# Patient Record
Sex: FEMALE | Race: WHITE | Employment: OTHER
[De-identification: names, ages, dates, MRNs, and addresses within clinical notes are randomized per-mention and may not be internally consistent; named-entity substitution may affect disease eponyms.]

---

## 2017-01-04 ENCOUNTER — SURGERY (OUTPATIENT)
Age: 77
End: 2017-01-04

## 2017-01-04 ENCOUNTER — ANESTHESIA EVENT (OUTPATIENT)
Dept: ENDOSCOPY | Facility: HOSPITAL | Age: 77
End: 2017-01-04

## 2017-01-04 ENCOUNTER — ANESTHESIA (OUTPATIENT)
Dept: ENDOSCOPY | Facility: HOSPITAL | Age: 77
End: 2017-01-04

## 2017-01-04 NOTE — ANESTHESIA PREPROCEDURE EVALUATION
PRE-OP EVALUATION    Patient Name: Denice Davenport    Pre-op Diagnosis: NAUSEA    Procedure(s):  ESOPHAGOGASTRODUODENOSCOPY     Surgeon(s) and Role:     Lisa Crow MD - Primary    Pre-op vitals reviewed.   Temp: 97.7 °F (36.5 °C)  Pulse: 74  Re 2005, 10/2/08    Comment Mario Carrion MD    INJECTION, W/WO CONTRAST, DX/THERAPEUTIC SUBSTANCE, EPIDURAL/SUBARACHNOID; LUMBAR/SACRAL  7/17/2012    Comment Procedure: LUMBAR EPIDURAL;  Surgeon: Olivier Gallagher MD;  Location: [address] [place] [address] FACET INJECTION OR MEDIAL BRANCH NERVE BLOCK;  Surgeon: Cortez Rosa MD;  Location: [facility]    INJ PARAVERT F JNT L/S 1 LEV  3/5/2013    Comment Procedure: LUMBAR FACET INJECTION OR MEDIAL BRANCH NERVE BLOCK;  Surgeon: Dav Daily Karena Joya MD;  Location: [address] [place] [address]    PATIENT [address] [place] [address] FOR IV ANTIBIOTIC SURGICAL SITE INFECTION PROPHYLAXIS.  7/29/2013    Comment Procedure: LUMBAR RADIOFREQUENCY;  Surgeon: Karena Joya MD;  Location: [facility] Comment Procedure: CAUDAL EPIDURAL STEROID INJECTION;  Surgeon: Cori Guerra MD;  Location: [facility]    COLONOSCOPY  4/29/16    Comment [facility]- DR Maria Alejandra Garland- Repeat in 5 yrs    OOPHORECTOMY Bilateral 2004    Comment total hystero due to ovar

## 2017-01-04 NOTE — ANESTHESIA POSTPROCEDURE EVALUATION
[address] [place] [address] Patient Status:  Hospital Outpatient Surgery   Age/Gender 67year old female MRN [id number]   Location [facility]. Attending Ko Saini MD   Hosp Day # 0 PCP Vargas Gomez MD       Anesthesia Po

## 2017-01-23 ENCOUNTER — APPOINTMENT (OUTPATIENT)
Dept: LAB | Age: 77
End: 2017-01-23
Attending: STUDENT IN AN ORGANIZED HEALTH CARE EDUCATION/TRAINING PROGRAM
Payer: MEDICARE

## 2017-01-23 DIAGNOSIS — K30 FUNCTIONAL DYSPEPSIA: ICD-10-CM

## 2017-01-23 LAB
FOLATE (FOLIC ACID), SERUM: >100 NG/ML (ref 8.7–24)
HAV AB SERPL IA-ACNC: 406 PG/ML (ref 193–986)

## 2017-01-23 PROCEDURE — 83921 ORGANIC ACID SINGLE QUANT: CPT

## 2017-01-23 PROCEDURE — 82746 ASSAY OF FOLIC ACID SERUM: CPT

## 2017-01-23 PROCEDURE — 36415 COLL VENOUS BLD VENIPUNCTURE: CPT

## 2017-01-23 PROCEDURE — 82607 VITAMIN B-12: CPT

## 2017-01-25 LAB — MMA: 0.23 UMOL/L

## 2017-01-30 ENCOUNTER — HOSPITAL ENCOUNTER (OUTPATIENT)
Dept: NUCLEAR MEDICINE | Facility: HOSPITAL | Age: 77
Discharge: HOME | End: 2017-01-30
Attending: STUDENT IN AN ORGANIZED HEALTH CARE EDUCATION/TRAINING PROGRAM
Payer: MEDICARE

## 2017-01-30 DIAGNOSIS — K30 FUNCTIONAL DYSPEPSIA: ICD-10-CM

## 2017-01-30 PROCEDURE — 78264 GASTRIC EMPTYING IMG STUDY: CPT

## 2017-02-01 ENCOUNTER — MED REC SCAN ONLY (OUTPATIENT)
Dept: INTERNAL MEDICINE CLINIC | Facility: CLINIC | Age: 77
End: 2017-02-01

## 2017-02-07 PROBLEM — M54.50 CHRONIC LEFT-SIDED LOW BACK PAIN WITHOUT SCIATICA: Status: ACTIVE | Noted: 2017-02-07

## 2017-02-07 PROBLEM — G89.29 CHRONIC LEFT-SIDED LOW BACK PAIN WITHOUT SCIATICA: Status: ACTIVE | Noted: 2017-02-07

## 2017-04-20 ENCOUNTER — OFFICE VISIT (OUTPATIENT)
Dept: INTERNAL MEDICINE CLINIC | Facility: CLINIC | Age: 77
End: 2017-04-20

## 2017-04-20 ENCOUNTER — APPOINTMENT (OUTPATIENT)
Dept: LAB | Age: 77
End: 2017-04-20
Attending: PHYSICIAN ASSISTANT
Payer: MEDICARE

## 2017-04-20 VITALS
BODY MASS INDEX: 23.75 KG/M2 | SYSTOLIC BLOOD PRESSURE: 118 MMHG | DIASTOLIC BLOOD PRESSURE: 70 MMHG | WEIGHT: 121 LBS | HEART RATE: 72 BPM | HEIGHT: 60 IN | TEMPERATURE: 98 F

## 2017-04-20 DIAGNOSIS — K86.2 PANCREATIC CYST: Primary | ICD-10-CM

## 2017-04-20 DIAGNOSIS — K86.2 PANCREATIC CYST: ICD-10-CM

## 2017-04-20 DIAGNOSIS — R10.13 MIDEPIGASTRIC PAIN: ICD-10-CM

## 2017-04-20 DIAGNOSIS — R11.0 CHRONIC NAUSEA: ICD-10-CM

## 2017-04-20 PROCEDURE — 82150 ASSAY OF AMYLASE: CPT

## 2017-04-20 PROCEDURE — 99214 OFFICE O/P EST MOD 30 MIN: CPT | Performed by: PHYSICIAN ASSISTANT

## 2017-04-20 PROCEDURE — 83690 ASSAY OF LIPASE: CPT

## 2017-04-20 PROCEDURE — 80053 COMPREHEN METABOLIC PANEL: CPT

## 2017-04-20 NOTE — PROGRESS NOTES
Patient presents with:  Colitis: possible; dx'd by [facility] GI       HPI:  Pt presents for follow up of abd pain and nausea. .  Pt reports she has seen GI, no apparent cause found and she did not respond to PPI and anti-emetics.   GI note reviewed with pt an chemistry     Atherosclerosis of native arteries of the extremities, unspecified     Other screening mammogram     Disorder of bone and cartilage, unspecified     Elevated blood pressure reading without diagnosis of hypertension     Allergic rhinitis, caus mmHg  Pulse 72  Temp(Src) 97.9 °F (36.6 °C) (Oral)  Ht 60\"  Wt 121 lb  BMI 23.63 kg/m2  Constitutional:  No distress. HEENT:  Normocephalic and atraumatic. Cardiovascular: Normal rate, regular rhythm. No murmur, rubs or gallops.    Pulmonary/Chest: Ef

## 2017-04-21 DIAGNOSIS — R74.8 ELEVATED ALKALINE PHOSPHATASE LEVEL: Primary | ICD-10-CM

## 2017-04-22 ENCOUNTER — APPOINTMENT (OUTPATIENT)
Dept: LAB | Facility: HOSPITAL | Age: 77
End: 2017-04-22
Attending: PHYSICIAN ASSISTANT
Payer: MEDICARE

## 2017-04-22 DIAGNOSIS — R74.8 ELEVATED ALKALINE PHOSPHATASE LEVEL: ICD-10-CM

## 2017-04-22 PROCEDURE — 36415 COLL VENOUS BLD VENIPUNCTURE: CPT

## 2017-04-22 PROCEDURE — 80076 HEPATIC FUNCTION PANEL: CPT

## 2017-04-22 PROCEDURE — 84080 ASSAY ALKALINE PHOSPHATASES: CPT

## 2017-04-22 PROCEDURE — 84075 ASSAY ALKALINE PHOSPHATASE: CPT

## 2017-04-26 DIAGNOSIS — R74.8 ABNORMAL SERUM LEVEL OF ALKALINE PHOSPHATASE: Primary | ICD-10-CM

## 2017-04-26 NOTE — PROGRESS NOTES
Quick Note:    Increased alk phos bone fractionation. Discussed with Dr. Paige Darling. Need bone scan in work up. I ordered.  Please have pt complete and keep follow up with JL.  ______

## 2017-05-01 ENCOUNTER — TELEPHONE (OUTPATIENT)
Dept: INTERNAL MEDICINE CLINIC | Facility: CLINIC | Age: 77
End: 2017-05-01

## 2017-05-01 ENCOUNTER — HOSPITAL ENCOUNTER (OUTPATIENT)
Dept: NUCLEAR MEDICINE | Facility: HOSPITAL | Age: 77
Discharge: HOME | End: 2017-05-01
Attending: PHYSICIAN ASSISTANT
Payer: MEDICARE

## 2017-05-01 DIAGNOSIS — R74.8 ABNORMAL SERUM LEVEL OF ALKALINE PHOSPHATASE: ICD-10-CM

## 2017-05-01 PROCEDURE — 78399 UNLISTED MUSCSKEL PX DX NUC: CPT

## 2017-05-01 PROCEDURE — 78320 NM BONE SPECT WITH CT (CPT=78306/78320/78399): CPT

## 2017-05-01 PROCEDURE — 78306 BONE IMAGING WHOLE BODY: CPT

## 2017-05-01 NOTE — TELEPHONE ENCOUNTER
Received call from Krystina Tamez at [facility], pt currently completing NM study. Requesting CB change NM bone scan to NM bone with Spect. States that is how study was done in previous years and that's what they would like for comparison.  Advised CB is o

## 2017-05-01 NOTE — PROGRESS NOTES
Quick Note:    Bone scan is showing uptake in the sacrum that are consistent with stress fractures. I would have her f/u with Ortho regarding this (she has already been seeing) and also uptake related to arthritis in several locations. . Radiology suggested

## 2017-05-02 ENCOUNTER — HOSPITAL ENCOUNTER (OUTPATIENT)
Dept: MRI IMAGING | Facility: HOSPITAL | Age: 77
Discharge: HOME | End: 2017-05-02
Attending: PHYSICIAN ASSISTANT
Payer: MEDICARE

## 2017-05-02 DIAGNOSIS — K86.2 PANCREATIC CYST: ICD-10-CM

## 2017-05-02 DIAGNOSIS — R11.0 CHRONIC NAUSEA: ICD-10-CM

## 2017-05-02 DIAGNOSIS — R10.13 MIDEPIGASTRIC PAIN: ICD-10-CM

## 2017-05-02 DIAGNOSIS — R94.8 ABNORMAL BONE SCAN OF CERVICAL SPINE: Primary | ICD-10-CM

## 2017-05-02 PROCEDURE — A9575 INJ GADOTERATE MEGLUMI 0.1ML: HCPCS | Performed by: PHYSICIAN ASSISTANT

## 2017-05-02 PROCEDURE — 74183 MRI ABD W/O CNTR FLWD CNTR: CPT

## 2017-05-03 NOTE — PROGRESS NOTES
Quick Note:    New foci seen in the pancreas, the older foci appears stable. Pt needs to follow up with Dr. Krystyna Wolfe (GI) regarding these new changes. Unclear what these are at this time.   ______

## 2017-05-10 ENCOUNTER — APPOINTMENT (OUTPATIENT)
Dept: LAB | Age: 77
End: 2017-05-10
Attending: STUDENT IN AN ORGANIZED HEALTH CARE EDUCATION/TRAINING PROGRAM
Payer: MEDICARE

## 2017-05-10 DIAGNOSIS — K59.1 FUNCTIONAL DIARRHEA: ICD-10-CM

## 2017-05-10 DIAGNOSIS — R11.0 NAUSEA: ICD-10-CM

## 2017-05-10 DIAGNOSIS — R10.84 ABDOMINAL PAIN, GENERALIZED: ICD-10-CM

## 2017-05-10 DIAGNOSIS — K30 INDIGESTION: ICD-10-CM

## 2017-05-10 PROCEDURE — 83690 ASSAY OF LIPASE: CPT

## 2017-05-10 PROCEDURE — 36415 COLL VENOUS BLD VENIPUNCTURE: CPT

## 2017-05-10 PROCEDURE — 82150 ASSAY OF AMYLASE: CPT

## 2017-05-11 ENCOUNTER — APPOINTMENT (OUTPATIENT)
Dept: LAB | Age: 77
End: 2017-05-11
Attending: STUDENT IN AN ORGANIZED HEALTH CARE EDUCATION/TRAINING PROGRAM
Payer: MEDICARE

## 2017-05-11 DIAGNOSIS — R11.0 NAUSEA: ICD-10-CM

## 2017-05-11 DIAGNOSIS — K30 INDIGESTION: ICD-10-CM

## 2017-05-11 DIAGNOSIS — R10.84 ABDOMINAL PAIN, GENERALIZED: ICD-10-CM

## 2017-05-11 DIAGNOSIS — K59.1 FUNCTIONAL DIARRHEA: ICD-10-CM

## 2017-05-11 PROCEDURE — 82656 EL-1 FECAL QUAL/SEMIQ: CPT

## 2017-05-22 ENCOUNTER — MED REC SCAN ONLY (OUTPATIENT)
Dept: INTERNAL MEDICINE CLINIC | Facility: CLINIC | Age: 77
End: 2017-05-22

## 2017-07-03 ENCOUNTER — MED REC SCAN ONLY (OUTPATIENT)
Dept: OBGYN CLINIC | Facility: CLINIC | Age: 77
End: 2017-07-03

## 2017-07-03 NOTE — PROGRESS NOTES
HPI:    Patient ID: Olivia Schwartz is a 67year old female.     HPI  Pt here today to fu after visit with CB in April, was to fu in 3 weeks, several tests done, seeing psych for increased anxiety, having nausea, negative gi monson, titrating off cymbalta, s (three) times daily. Disp: 180 tablet Rfl: 0   Cholecalciferol (VITAMIN D) 1000 UNITS Oral Tab Take by mouth daily. Disp:  Rfl:    CALCIUM + D 600-200 MG-UNIT OR TABS 2 TABLET DAILY Disp:  Rfl:      Allergies:   Other                     Prochlorperazine

## 2017-07-11 ENCOUNTER — MED REC SCAN ONLY (OUTPATIENT)
Dept: INTERNAL MEDICINE CLINIC | Facility: CLINIC | Age: 77
End: 2017-07-11

## 2017-07-11 ENCOUNTER — TELEPHONE (OUTPATIENT)
Dept: INTERNAL MEDICINE CLINIC | Facility: CLINIC | Age: 77
End: 2017-07-11

## 2017-07-11 NOTE — PROGRESS NOTES
Received office visit from Dana Morin MD. [address] 7/7/2017   Placed on JL bin to review and sign

## 2017-07-18 PROBLEM — M54.50 CHRONIC LOW BACK PAIN: Status: ACTIVE | Noted: 2017-07-18

## 2017-07-18 PROBLEM — G89.29 CHRONIC LOW BACK PAIN: Status: ACTIVE | Noted: 2017-07-18

## 2017-07-25 PROBLEM — S22.058A: Status: ACTIVE | Noted: 2017-07-25

## 2017-07-25 PROBLEM — M80.00XA OSTEOPOROSIS WITH CURRENT PATHOLOGICAL FRACTURE, UNSPECIFIED OSTEOPOROSIS TYPE, INITIAL ENCOUNTER: Status: ACTIVE | Noted: 2017-07-25

## 2017-07-27 ENCOUNTER — LAB ENCOUNTER (OUTPATIENT)
Dept: LAB | Age: 77
End: 2017-07-27
Attending: PAIN MEDICINE
Payer: COMMERCIAL

## 2017-07-27 DIAGNOSIS — S22.058A: ICD-10-CM

## 2017-07-27 DIAGNOSIS — M80.08XG PATHOLOGICAL FRACTURE OF VERTEBRA DUE TO OSTEOPOROSIS WITH DELAYED HEALING, UNSPECIFIED OSTEOPOROSIS TYPE, SUBSEQUENT ENCOUNTER: ICD-10-CM

## 2017-07-27 DIAGNOSIS — M48.30 TRAUMATIC SPONDYLOPATHY: ICD-10-CM

## 2017-07-27 DIAGNOSIS — S22.000G CLOSED WEDGE FRACTURE OF THORACIC VERTEBRA WITH DELAYED HEALING, UNSPECIFIED THORACIC VERTEBRAL LEVEL, SUBSEQUENT ENCOUNTER: ICD-10-CM

## 2017-07-27 PROCEDURE — 88304 TISSUE EXAM BY PATHOLOGIST: CPT

## 2017-07-27 PROCEDURE — 88311 DECALCIFY TISSUE: CPT

## 2017-08-01 PROCEDURE — 83970 ASSAY OF PARATHORMONE: CPT | Performed by: PHYSICIAN ASSISTANT

## 2017-08-02 PROBLEM — Z98.890 S/P KYPHOPLASTY: Status: ACTIVE | Noted: 2017-08-02

## 2017-09-26 ENCOUNTER — TELEPHONE (OUTPATIENT)
Dept: INTERNAL MEDICINE CLINIC | Facility: CLINIC | Age: 77
End: 2017-09-26

## 2017-09-26 NOTE — TELEPHONE ENCOUNTER
Pt has had colitis for 2 years now and has seen [facility] and she is not getting any relief from either of them-she is asking who JL can refer her to now??

## 2017-09-28 NOTE — TELEPHONE ENCOUNTER
Allen Blackwood MD   You Yesterday (9:46 AM)      I would defer to [facility] on their recomendations (Routing comment)

## 2017-09-28 NOTE — TELEPHONE ENCOUNTER
Patient notified Carolin Jones would defer to [facility] for recommendations. Pt verbalizes understanding.

## 2017-10-25 ENCOUNTER — OFFICE VISIT (OUTPATIENT)
Dept: INTERNAL MEDICINE CLINIC | Facility: CLINIC | Age: 77
End: 2017-10-25

## 2017-10-25 VITALS
OXYGEN SATURATION: 98 % | RESPIRATION RATE: 16 BRPM | BODY MASS INDEX: 22.38 KG/M2 | SYSTOLIC BLOOD PRESSURE: 120 MMHG | DIASTOLIC BLOOD PRESSURE: 60 MMHG | HEART RATE: 83 BPM | WEIGHT: 114 LBS | HEIGHT: 60 IN

## 2017-10-25 DIAGNOSIS — E78.00 HYPERCHOLESTEROLEMIA: ICD-10-CM

## 2017-10-25 DIAGNOSIS — Z12.39 SCREENING FOR BREAST CANCER: ICD-10-CM

## 2017-10-25 DIAGNOSIS — Z00.00 ROUTINE GENERAL MEDICAL EXAMINATION AT A HEALTH CARE FACILITY: Primary | ICD-10-CM

## 2017-10-25 DIAGNOSIS — I10 ESSENTIAL HYPERTENSION: ICD-10-CM

## 2017-10-25 PROCEDURE — G0008 ADMIN INFLUENZA VIRUS VAC: HCPCS | Performed by: INTERNAL MEDICINE

## 2017-10-25 PROCEDURE — G0439 PPPS, SUBSEQ VISIT: HCPCS | Performed by: INTERNAL MEDICINE

## 2017-10-25 PROCEDURE — 90653 IIV ADJUVANT VACCINE IM: CPT | Performed by: INTERNAL MEDICINE

## 2017-10-25 PROCEDURE — 99212 OFFICE O/P EST SF 10 MIN: CPT | Performed by: INTERNAL MEDICINE

## 2017-10-25 NOTE — PROGRESS NOTES
HPI:    Patient ID: Bill Chin is a 65year old female.     HPI  Here for awv, currently being evaluated for small bowel bacterial overgrowth, htn, parkinsons dx, thinks had prevnar 13 at [facility], will check, last optho 2 years ago, reminded to go, has Tab Take 2,000 Units by mouth daily. Disp:  Rfl:    CALCIUM + D 600-200 MG-UNIT OR TABS 2 TABLET DAILY Disp:  Rfl:      Allergies: Other                     Prochlorperazine            Comment:Other reaction(s):  Other (See Comments)             may wors

## 2017-11-30 ENCOUNTER — LAB ENCOUNTER (OUTPATIENT)
Dept: LAB | Age: 77
End: 2017-11-30
Attending: INTERNAL MEDICINE
Payer: MEDICARE

## 2017-11-30 DIAGNOSIS — E78.00 HYPERCHOLESTEROLEMIA: ICD-10-CM

## 2017-11-30 DIAGNOSIS — I10 ESSENTIAL HYPERTENSION: ICD-10-CM

## 2017-11-30 PROCEDURE — 36415 COLL VENOUS BLD VENIPUNCTURE: CPT

## 2017-11-30 PROCEDURE — 85025 COMPLETE CBC W/AUTO DIFF WBC: CPT

## 2017-11-30 PROCEDURE — 80053 COMPREHEN METABOLIC PANEL: CPT

## 2017-11-30 PROCEDURE — 80061 LIPID PANEL: CPT

## 2017-12-13 ENCOUNTER — NURSE ONLY (OUTPATIENT)
Dept: HEMATOLOGY/ONCOLOGY | Facility: HOSPITAL | Age: 77
End: 2017-12-13
Attending: OBSTETRICS & GYNECOLOGY
Payer: MEDICARE

## 2017-12-13 DIAGNOSIS — C56.9 OVARIAN CANCER (HCC): ICD-10-CM

## 2017-12-13 DIAGNOSIS — C56.9 MALIGNANT NEOPLASM OF OVARY (HCC): Primary | ICD-10-CM

## 2017-12-13 PROCEDURE — 86304 IMMUNOASSAY TUMOR CA 125: CPT

## 2017-12-13 PROCEDURE — 36591 DRAW BLOOD OFF VENOUS DEVICE: CPT

## 2017-12-13 RX ORDER — SODIUM CHLORIDE 0.9 % (FLUSH) 0.9 %
10 SYRINGE (ML) INJECTION ONCE
Status: COMPLETED | OUTPATIENT
Start: 2017-12-13 | End: 2017-12-13

## 2017-12-13 RX ADMIN — SODIUM CHLORIDE 0.9 % (FLUSH) 10 ML: 0.9 % SYRINGE (ML) INJECTION at 10:50:00

## 2017-12-14 ENCOUNTER — HOSPITAL ENCOUNTER (OUTPATIENT)
Dept: MAMMOGRAPHY | Age: 77
Discharge: HOME | End: 2017-12-14
Attending: INTERNAL MEDICINE
Payer: MEDICARE

## 2017-12-14 DIAGNOSIS — Z12.39 SCREENING FOR BREAST CANCER: ICD-10-CM

## 2017-12-14 PROCEDURE — 77067 SCR MAMMO BI INCL CAD: CPT | Performed by: INTERNAL MEDICINE

## 2018-01-09 ENCOUNTER — TELEPHONE (OUTPATIENT)
Dept: OBGYN CLINIC | Facility: CLINIC | Age: 78
End: 2018-01-09

## 2018-01-30 PROBLEM — M80.00XD AGE-RELATED OSTEOPOROSIS WITH CURRENT PATHOLOGICAL FRACTURE WITH ROUTINE HEALING, SUBSEQUENT ENCOUNTER: Status: ACTIVE | Noted: 2018-01-30

## 2018-02-20 DIAGNOSIS — E78.00 PURE HYPERCHOLESTEROLEMIA: ICD-10-CM

## 2018-02-20 RX ORDER — SIMVASTATIN 20 MG
TABLET ORAL
Qty: 90 TABLET | Refills: 3 | Status: SHIPPED | OUTPATIENT
Start: 2018-02-20 | End: 2018-11-05

## 2018-02-28 ENCOUNTER — ANESTHESIA EVENT (OUTPATIENT)
Dept: ENDOSCOPY | Facility: HOSPITAL | Age: 78
End: 2018-02-28

## 2018-02-28 ENCOUNTER — SURGERY (OUTPATIENT)
Age: 78
End: 2018-02-28

## 2018-02-28 ENCOUNTER — ANESTHESIA (OUTPATIENT)
Dept: ENDOSCOPY | Facility: HOSPITAL | Age: 78
End: 2018-02-28

## 2018-02-28 ENCOUNTER — HOSPITAL ENCOUNTER (OUTPATIENT)
Facility: HOSPITAL | Age: 78
Setting detail: HOSPITAL OUTPATIENT SURGERY
Discharge: HOME | End: 2018-02-28
Attending: INTERNAL MEDICINE | Admitting: INTERNAL MEDICINE
Payer: MEDICARE

## 2018-02-28 VITALS
HEART RATE: 62 BPM | TEMPERATURE: 98 F | SYSTOLIC BLOOD PRESSURE: 142 MMHG | OXYGEN SATURATION: 99 % | RESPIRATION RATE: 18 BRPM | HEIGHT: 60 IN | BODY MASS INDEX: 22.58 KG/M2 | WEIGHT: 115 LBS | DIASTOLIC BLOOD PRESSURE: 55 MMHG

## 2018-02-28 DIAGNOSIS — R11.0 NAUSEA: ICD-10-CM

## 2018-02-28 PROCEDURE — 0DB68ZX EXCISION OF STOMACH, VIA NATURAL OR ARTIFICIAL OPENING ENDOSCOPIC, DIAGNOSTIC: ICD-10-PCS | Performed by: INTERNAL MEDICINE

## 2018-02-28 PROCEDURE — 88305 TISSUE EXAM BY PATHOLOGIST: CPT | Performed by: INTERNAL MEDICINE

## 2018-02-28 RX ORDER — SODIUM CHLORIDE, SODIUM LACTATE, POTASSIUM CHLORIDE, CALCIUM CHLORIDE 600; 310; 30; 20 MG/100ML; MG/100ML; MG/100ML; MG/100ML
INJECTION, SOLUTION INTRAVENOUS CONTINUOUS
Status: DISCONTINUED | OUTPATIENT
Start: 2018-02-28 | End: 2018-02-28

## 2018-02-28 NOTE — DISCHARGE SUMMARY
Outpatient Surgery Brief Discharge Summary         Patient ID:  Marie Sanders  [id number]  82 year old  9/28/1940    Discharge Diagnoses: Gastritis, Esophagitis, hiatal hernia    Procedures: EGD    Discharged Condition: stable    Disposition: home    P

## 2018-02-28 NOTE — OPERATIVE REPORT
[facility] OPERATIVE REPORT   PATIENT NAME: Jahaira Ford  MRN: [id number]  DATE OF OPERATION: 2/28/2018  PREOPERATIVE DIAGNOSIS:   1. Nausea   POSTOPERATIVE DIAGNOSES:   Erosive Gastritis s/p biopsy  5 cm hiatal hernia  1 d

## 2018-02-28 NOTE — H&P
HPI:  Melissa Hernandez is a 65year old female. 9/28/1940.   Patient presents with:  Nausea  Gas  Bloating  Abdominal Pain: occasional. lower pain.        Phu Higuera is here today for a follow up visit to discuss nausea.      pushes to eat during the [facility]  7/29/2013: DESTROY LUMB/SAC FACET JNT      Comment: Procedure: Francisco Deng;  Surgeon:                Joel Hu MD;  Location:  [address] MANAGEMENT  1/4/2017: EGD N/A      C Location: [address] [place] [address] [place] MANAGEMENT  7/17/2012: INJECTION, W/WO CONTRAST, DX/THERAPEUTIC SUBST*      Comment: Procedure: LUMBAR EPIDURAL;  Surgeon:                Tabatha Marsh MD;  Location: [place] [address] MANAGEMENT  12/23/201 Location: [address] [place] [address] [facility] MANAGEMENT  3/5/2013: PATIENT DOCUMENTED NOT TO HAVE EXPERIENCED ANY*      Comment: Procedure: LUMBAR FACET INJECTION OR MEDIAL                BRANCH NERVE BLOCK;  Surgeon: Joel Hu MD;  Location: [facility] Surgeon: Ladonna Linod MD;  Location: [facility]  1999: SHOULDER SURG PROC UNLISTED      Comment: Bone spurs removed (Rt shoulder)  9/2/10= HH: UPPER GI ENDOSCOPY PERFORMED   Social History  Smoking status: Former Smoker Disp:  Rfl:    CALCIUM + D 600-200 MG-UNIT OR TABS 2 TABLET DAILY Disp:  Rfl:             REVIEW OF SYSTEMS:   GENERAL: feels well otherwise  SKIN: denies any unusual skin lesions  EYES: denies any new visual changes or double vision  HEENT: denies any new Decay-corrected gastric emptying values were derived utilizing geometric mean values from the anterior and posterior projections.     PHARMACEUTICAL:  Technetium 99m, 1.0 uCi     FINDINGS:       1 Hour:   10 %   (Normal = 10% - 70%)  2 Hour:   47 %   (Norm extrarenal pelvis. BONES:  Scoliotic curvature to the spine. Degenerative change to the spine.     =====  CONCLUSION:    1. There are multiple T2 hyperintense foci within the pancreas.  For example, there is an 8 mm T2 hyperintense nonenhancing focus withi

## 2018-02-28 NOTE — ANESTHESIA PREPROCEDURE EVALUATION
PRE-OP EVALUATION    Patient Name: Bard Cogan    Pre-op Diagnosis: Nausea [R11.0]    Procedure(s):  ESOPHAGOGASTRODUODENOSCOPY     Surgeon(s) and Role:     * Lilo Panchal MD - Primary    Pre-op vitals reviewed.   Temp: 97.7 °F (36.5 °C)  Pulse: Endo/Other                                  Pulmonary                           Neuro/Psych  Comment: parkinsons dx                                  Past Surgical History:  1994: CHOLECYSTECTOMY  2005, 10/2/08: COLONOSCOPY      Comment: Soraida Lucero MD INJ PARAVERT F JNT L/S 2 LEV      Comment: Procedure: LUMBAR FACET INJECTION OR MEDIAL                BRANCH NERVE BLOCK;  Surgeon: Edith Mcconnell MD;  Location: [address] [place] [address] MANAGEMENT  2/14/2013: INJ PARAVERT F JNT L/S 2 LEV      C [address] [facility] 59  N SVC 5+ YR      Comment: Procedure: LUMBAR FACET INJECTION OR MEDIAL                BRANCH NERVE BLOCK;  Surgeon: Racheal Rosado MD;  Location: [address] [place] [address] MANAGEMENT  7/29/2013: PAPA BY [facility] [address] [facility] 59  N SVC 5+ YR      Comm [facility]  3/5/2013: PATIENT Omar Velarde PREOPERATIVE ORDER FOR IV ANT*      Comment: Procedure: LUMBAR FACET INJECTION OR MEDIAL                BRANCH NERVE BLOCK;  Surgeon: Silvina Hudson MD;  Location: [address] [place] [address]

## 2018-02-28 NOTE — ANESTHESIA POSTPROCEDURE EVALUATION
[address] [place] [address] Patient Status:  Hospital Outpatient Surgery   Age/Gender 65year old female MRN [id number]   [facility] ENDOSCOPY Attending Yazan Ng MD   Hosp Day # 0 PCP Juan Carlos Musa MD       Anesthesia Post

## 2018-03-05 NOTE — PROGRESS NOTES
3/5/2018  [address]    Dear Jonatan Reis,       Here are the biopsy/pathology findings from your recent EGD (Upper  Endoscopy) :     gastritis - an inflammation of the lining of the stomach.       Follow Up:  Continu

## 2018-03-26 ENCOUNTER — OFFICE VISIT (OUTPATIENT)
Dept: INTERNAL MEDICINE CLINIC | Facility: CLINIC | Age: 78
End: 2018-03-26

## 2018-03-26 VITALS
HEART RATE: 72 BPM | WEIGHT: 113 LBS | TEMPERATURE: 98 F | SYSTOLIC BLOOD PRESSURE: 142 MMHG | BODY MASS INDEX: 22.19 KG/M2 | DIASTOLIC BLOOD PRESSURE: 80 MMHG | HEIGHT: 60 IN

## 2018-03-26 DIAGNOSIS — R35.0 URINARY FREQUENCY: Primary | ICD-10-CM

## 2018-03-26 DIAGNOSIS — G20 PARKINSON DISEASE (HCC): ICD-10-CM

## 2018-03-26 LAB
APPEARANCE: CLEAR
MULTISTIX LOT#: ABNORMAL NUMERIC
PH, URINE: 7.5 (ref 4.5–8)
SPECIFIC GRAVITY: 1.01 (ref 1–1.03)
URINE-COLOR: YELLOW
UROBILINOGEN,SEMI-QN: 0.2 MG/DL (ref 0–1.9)

## 2018-03-26 PROCEDURE — 81003 URINALYSIS AUTO W/O SCOPE: CPT | Performed by: INTERNAL MEDICINE

## 2018-03-26 PROCEDURE — 99213 OFFICE O/P EST LOW 20 MIN: CPT | Performed by: INTERNAL MEDICINE

## 2018-03-26 NOTE — PROGRESS NOTES
Loco Clifford  9/28/1940    Patient presents with:  Urinary Frequency: LG. Room 12.  And bladder pressrue for a couple of months on and off      Kyara Mayo is a 65year old female with history of parkinson disease who presents with mg by mouth daily. Disp:  Rfl:    carbidopa-levodopa (SINEMET)  MG Oral Tab Take 2 tablets by mouth 3 (three) times daily. Disp: 180 tablet Rfl: 0   Cholecalciferol (VITAMIN D) 1000 UNITS Oral Tab Take 2,000 Units by mouth daily.    Disp:  Rfl:    STARLA degenerative disc disease     Bulge of lumbar disc without myelopathy     Myofascial pain     Lumbar spondylosis     Malignant neoplasm of ovary (HCC)     Left hip pain     Pathologic fracture of vertebrae     Ovarian cancer (HCC)     Pancreas cyst     Col PAIN MANAGEMENT  8/2004: HYSTERECTOMY      Comment: Due to ovarian ca (chemo treatments)  2/14/2013: INJ PARAVERT F JNT L/S 1 LEV      Comment: Procedure: LUMBAR FACET INJECTION OR MEDIAL                BRANCH NERVE BLOCK;  Surgeon: Yobani Smith, INJECTION;                Surgeon: Raina Bauer MD;  Location: [place] [address]  7/17/2012: [facility]ALL[facility] BY [facility] 5+ YR      Comment: Procedure: LUMBAR EPIDURAL;  Surgeon:                Olivier Gallagher MD;  Location: [address] [place] [address] Procedure: CAUDAL EPIDURAL STEROID INJECTION;                Surgeon: Ria Bauer MD;  Location: [facility]  8/25/2015: PATIENT DOCUMENTED NOT TO HAVE EXPERIENCED ANY* N/A      Comment: Procedure: CAUDAL EPIDURAL STEROID INJECTION; Left arm, Patient Position: Sitting, Cuff Size: adult)   Pulse 72   Temp 98.1 °F (36.7 °C) (Oral)   Ht 60\"   Wt 113 lb   BMI 22.07 kg/m²   Constitutional: Oriented to person, place, and time. No distress.    Cardiovascular: Normal rate, regular rhythm and

## 2018-04-19 PROBLEM — M47.816 LUMBAR FACET ARTHROPATHY: Status: ACTIVE | Noted: 2018-04-19

## 2018-04-19 PROBLEM — M41.9 SCOLIOSIS OF THORACOLUMBAR SPINE, UNSPECIFIED SCOLIOSIS TYPE: Status: ACTIVE | Noted: 2018-04-19

## 2018-06-21 PROBLEM — R19.7 DIARRHEA: Status: ACTIVE | Noted: 2018-06-21

## 2018-06-23 PROCEDURE — 87427 SHIGA-LIKE TOXIN AG IA: CPT | Performed by: INTERNAL MEDICINE

## 2018-06-23 PROCEDURE — 87046 STOOL CULTR AEROBIC BACT EA: CPT | Performed by: INTERNAL MEDICINE

## 2018-06-23 PROCEDURE — 87045 FECES CULTURE AEROBIC BACT: CPT | Performed by: INTERNAL MEDICINE

## 2018-07-21 ENCOUNTER — HOSPITAL ENCOUNTER (OUTPATIENT)
Dept: MRI IMAGING | Facility: HOSPITAL | Age: 78
Discharge: HOME | End: 2018-07-21
Attending: PHYSICIAN ASSISTANT
Payer: MEDICARE

## 2018-07-21 DIAGNOSIS — Z85.43 H/O OVARIAN CANCER: ICD-10-CM

## 2018-07-21 DIAGNOSIS — M54.16 LUMBAR RADICULOPATHY: ICD-10-CM

## 2018-07-21 LAB — CREAT SERPL-MCNC: 0.4 MG/DL (ref 0.55–1.02)

## 2018-07-21 PROCEDURE — 82565 ASSAY OF CREATININE: CPT

## 2018-07-21 PROCEDURE — A9576 INJ PROHANCE MULTIPACK: HCPCS

## 2018-07-21 PROCEDURE — 72158 MRI LUMBAR SPINE W/O & W/DYE: CPT | Performed by: PHYSICIAN ASSISTANT

## 2018-07-23 NOTE — PROGRESS NOTES
Findings essentially stable.  Please let pt know and she may schedule f/u apt to further review at a time of her convenience

## 2018-07-26 PROBLEM — M48.061 SPINAL STENOSIS OF LUMBAR REGION WITHOUT NEUROGENIC CLAUDICATION: Status: ACTIVE | Noted: 2018-07-26

## 2018-07-26 PROBLEM — M54.16 LUMBAR RADICULITIS: Status: ACTIVE | Noted: 2018-07-26

## 2018-07-26 PROBLEM — M41.9 SCOLIOSIS OF LUMBAR SPINE, UNSPECIFIED SCOLIOSIS TYPE: Status: ACTIVE | Noted: 2018-07-26

## 2018-08-16 ENCOUNTER — TELEPHONE (OUTPATIENT)
Dept: INTERNAL MEDICINE CLINIC | Facility: CLINIC | Age: 78
End: 2018-08-16

## 2018-08-16 DIAGNOSIS — R03.0 ELEVATED BLOOD PRESSURE READING WITHOUT DIAGNOSIS OF HYPERTENSION: ICD-10-CM

## 2018-08-16 DIAGNOSIS — E78.00 PURE HYPERCHOLESTEROLEMIA: Primary | ICD-10-CM

## 2018-08-16 NOTE — TELEPHONE ENCOUNTER
Future Appointments  Date Time Provider Leon Maria De Jesus   9/11/2018 10:45 AM Nurse, Sp Ortho [facility] [facility]   9/11/2018 1:00 PM Arleen Restrepo MD [facility]PAIN [facility] AT    11/5/2018 8:00 AM Carmen Ayon MD [facility] [facility]   6/25/2019 4:00 PM

## 2018-10-30 ENCOUNTER — LAB ENCOUNTER (OUTPATIENT)
Dept: LAB | Age: 78
End: 2018-10-30
Attending: INTERNAL MEDICINE
Payer: MEDICARE

## 2018-10-30 DIAGNOSIS — E78.00 PURE HYPERCHOLESTEROLEMIA: ICD-10-CM

## 2018-10-30 DIAGNOSIS — R03.0 ELEVATED BLOOD PRESSURE READING WITHOUT DIAGNOSIS OF HYPERTENSION: ICD-10-CM

## 2018-10-30 PROCEDURE — 85025 COMPLETE CBC W/AUTO DIFF WBC: CPT

## 2018-10-30 PROCEDURE — 36415 COLL VENOUS BLD VENIPUNCTURE: CPT

## 2018-10-30 PROCEDURE — 80061 LIPID PANEL: CPT

## 2018-10-30 PROCEDURE — 80053 COMPREHEN METABOLIC PANEL: CPT

## 2018-11-05 ENCOUNTER — OFFICE VISIT (OUTPATIENT)
Dept: INTERNAL MEDICINE CLINIC | Facility: CLINIC | Age: 78
End: 2018-11-05
Payer: MEDICARE

## 2018-11-05 VITALS
BODY MASS INDEX: 23.16 KG/M2 | RESPIRATION RATE: 18 BRPM | WEIGHT: 118 LBS | HEART RATE: 84 BPM | TEMPERATURE: 98 F | SYSTOLIC BLOOD PRESSURE: 100 MMHG | HEIGHT: 60 IN | DIASTOLIC BLOOD PRESSURE: 70 MMHG

## 2018-11-05 DIAGNOSIS — Z98.890 S/P KYPHOPLASTY: ICD-10-CM

## 2018-11-05 DIAGNOSIS — M54.50 CHRONIC BILATERAL LOW BACK PAIN WITHOUT SCIATICA: ICD-10-CM

## 2018-11-05 DIAGNOSIS — Z12.39 SCREENING FOR BREAST CANCER: ICD-10-CM

## 2018-11-05 DIAGNOSIS — G89.29 CHRONIC BILATERAL LOW BACK PAIN WITHOUT SCIATICA: ICD-10-CM

## 2018-11-05 DIAGNOSIS — G20 PARKINSON'S DISEASE (HCC): ICD-10-CM

## 2018-11-05 DIAGNOSIS — E78.00 PURE HYPERCHOLESTEROLEMIA: ICD-10-CM

## 2018-11-05 DIAGNOSIS — Z00.00 ROUTINE GENERAL MEDICAL EXAMINATION AT A HEALTH CARE FACILITY: ICD-10-CM

## 2018-11-05 DIAGNOSIS — G25.81 RESTLESS LEGS SYNDROME (RLS): Primary | ICD-10-CM

## 2018-11-05 PROCEDURE — G0439 PPPS, SUBSEQ VISIT: HCPCS | Performed by: INTERNAL MEDICINE

## 2018-11-05 PROCEDURE — G0008 ADMIN INFLUENZA VIRUS VAC: HCPCS | Performed by: INTERNAL MEDICINE

## 2018-11-05 PROCEDURE — 90670 PCV13 VACCINE IM: CPT | Performed by: INTERNAL MEDICINE

## 2018-11-05 PROCEDURE — 99212 OFFICE O/P EST SF 10 MIN: CPT | Performed by: INTERNAL MEDICINE

## 2018-11-05 PROCEDURE — G0009 ADMIN PNEUMOCOCCAL VACCINE: HCPCS | Performed by: INTERNAL MEDICINE

## 2018-11-05 PROCEDURE — 90653 IIV ADJUVANT VACCINE IM: CPT | Performed by: INTERNAL MEDICINE

## 2018-11-05 RX ORDER — SIMVASTATIN 20 MG
TABLET ORAL
Qty: 90 TABLET | Refills: 3 | Status: SHIPPED | OUTPATIENT
Start: 2018-11-05 | End: 2019-12-11

## 2018-11-05 NOTE — PROGRESS NOTES
HPI:    Patient ID: Marie Sanders is a 77year old female.     HPI  Here for awv, following at [facility] for parkinsons, colitis and with [facility] pain for chronic back pain  /70   Pulse 84   Temp 98 °F (36.7 °C) (Oral)   Resp 18   Ht 60\"   Wt 118 lb   [facility] Disp:  Rfl:    aspirin 81 MG Oral Tab Take 81 mg by mouth daily. Disp:  Rfl:    carbidopa-levodopa (SINEMET)  MG Oral Tab Take 2 tablets by mouth 3 (three) times daily.  Disp: 180 tablet Rfl: 0   Cholecalciferol (VITAMIN D) 1000 UNITS Oral Tab Take 2 Pneumococcal (Prevnar 13) (12013) (DX V03.82/Z23)      Meds This Visit:  Requested Prescriptions     Signed Prescriptions Disp Refills   • simvastatin 20 MG Oral Tab 90 tablet 3     Sig: TAKE 1 TABLET EVERY NIGHT       Imaging & Referrals:  FLU VACCINE ADJ

## 2018-12-06 ENCOUNTER — OFFICE VISIT (OUTPATIENT)
Dept: INTERNAL MEDICINE CLINIC | Facility: CLINIC | Age: 78
End: 2018-12-06
Payer: MEDICARE

## 2018-12-06 VITALS
HEART RATE: 84 BPM | WEIGHT: 117 LBS | SYSTOLIC BLOOD PRESSURE: 120 MMHG | BODY MASS INDEX: 22.97 KG/M2 | DIASTOLIC BLOOD PRESSURE: 60 MMHG | HEIGHT: 60 IN | TEMPERATURE: 98 F

## 2018-12-06 DIAGNOSIS — M51.36 LUMBAR DEGENERATIVE DISC DISEASE: ICD-10-CM

## 2018-12-06 DIAGNOSIS — G20 PARKINSON DISEASE (HCC): ICD-10-CM

## 2018-12-06 DIAGNOSIS — M54.16 LUMBAR RADICULITIS: ICD-10-CM

## 2018-12-06 DIAGNOSIS — Z01.818 PREOP EXAMINATION: Primary | ICD-10-CM

## 2018-12-06 DIAGNOSIS — M47.26 OSTEOARTHRITIS OF SPINE WITH RADICULOPATHY, LUMBAR REGION: ICD-10-CM

## 2018-12-06 DIAGNOSIS — M41.9 SCOLIOSIS OF LUMBAR SPINE, UNSPECIFIED SCOLIOSIS TYPE: ICD-10-CM

## 2018-12-06 DIAGNOSIS — E78.5 DYSLIPIDEMIA: ICD-10-CM

## 2018-12-06 PROCEDURE — 99214 OFFICE O/P EST MOD 30 MIN: CPT | Performed by: INTERNAL MEDICINE

## 2018-12-06 RX ORDER — MULTIVIT-MIN/IRON/FOLIC ACID/K 18-600-40
1 CAPSULE ORAL DAILY
COMMUNITY
End: 2021-11-05 | Stop reason: DRUGHIGH

## 2018-12-06 NOTE — PROGRESS NOTES
[facility]  PRE OP RISK ASSESSMENT    REASON FOR CONSULT: Pre-op risk assessment for surgical procedure: Vertiflex planned for: Lumbar radiculitis and lumbar spondylosis.     REQUESTING PHYSICIAN: Dr. Jocelin Kwan: Patient pres by Helen Mejía MD at [facility] 2250 N/A 12/23/2014    Performed by Helen Mejía MD at [address] [place] [address] Rd  2005, 10/2/08    Soraida Lucero MD   • COLONOSCOPY  4/8/13   • [facility]   • LUMBAR RADIOFREQUENCY N/A 7/29/2013    Performed by Bill Swenson MD at [address]   • OOPHORECTOMY Bilateral 2004    total hystero due to ovarian cancer.    • OTHER SURGICAL HISTORY  2004    surgery for RASH    Review Complete  12/06/2018      SOCIAL HISTORY: Social History    Socioeconomic History      Marital status:       Spouse name: Not on file      Number of children: Not on file      Years of education: Not on file      Highest education lev exertion  GI: denies abdominal pain, denies heartburn  : denies dysuria, urgency, frequency, hematuria. Urinary incontinence. MUSCULOSKELETAL: Lower back pain. Lower extremity weakness. NEURO: denies headaches. Ataxia. Upper extremity tremor.     [place]

## 2018-12-13 PROBLEM — Z96.89 S/P INSERTION OF SPINAL CORD STIMULATOR: Status: ACTIVE | Noted: 2018-12-13

## 2018-12-30 NOTE — PROGRESS NOTES
Quick Note:    Labs are normal other than elevated alk phos. Would like to check alk phos isoenzymes (I ordered). Please also let pt know that I spoke with Dr. Beth Lange about her appt yesterday.  He is in agreement with first steps and has next step in min No

## 2019-01-28 ENCOUNTER — ORDER TRANSCRIPTION (OUTPATIENT)
Dept: PHYSICAL THERAPY | Age: 79
End: 2019-01-28

## 2019-01-28 DIAGNOSIS — M62.89 PELVIC FLOOR DYSFUNCTION: ICD-10-CM

## 2019-01-28 DIAGNOSIS — K59.02 CONSTIPATION BY OUTLET DYSFUNCTION: Primary | ICD-10-CM

## 2019-02-13 ENCOUNTER — OFFICE VISIT (OUTPATIENT)
Dept: PHYSICAL THERAPY | Age: 79
End: 2019-02-13
Attending: INTERNAL MEDICINE
Payer: MEDICARE

## 2019-02-13 PROCEDURE — 97112 NEUROMUSCULAR REEDUCATION: CPT

## 2019-02-13 PROCEDURE — 97530 THERAPEUTIC ACTIVITIES: CPT

## 2019-02-13 PROCEDURE — 97163 PT EVAL HIGH COMPLEX 45 MIN: CPT

## 2019-02-13 NOTE — PROGRESS NOTES
MUSCULOSKELETAL AND PELVIC FLOOR EVALUATION:     Referring Physician: Dr. Anitha Adamson  Diagnosis: Constipation by outlet dysfunction (K59.02)  Pelvic floor dysfunction (M62.89)     Date of Service: 2/13/2019     PATIENT Kenroy Jones is a 77 ye Lumbar spondylosis     Malignant neoplasm of ovary (HCC)     Left hip pain     Pathologic fracture of vertebrae     Ovarian cancer ([facility] [address]     Pancreas cyst     Colitis, collagenous     Adenomatous colon polyp     Chronic left-sided low back pain without [facility] evaluation,  patient exhibits kyphosis; palpable Sacral stimulation unit; and vertical scar tissue adhesions and LE weakness resulting in wheeled walker ambulation.   Patient also needs assistance with don and donning clothing for internal a Anal Craig: hypo bilateral    Tissue Laxity Test:  Anterior Wall: Mod  Posterior Wall:  Mod  Apical: Min    Internal Evaluation:   Scar: Perineum  Pain: Negative  Spasm: Negative     Voluntary contraction: weak  Voluntary relaxation: weak    Pelvic Floor Mus 2) Patient exhibits an increase in Levator Ani/ External Anal Sphincter strength from 1-2/5 with 3 count hold to 2-3/5  with 10 count hold to allow for pelvic brace with ADLs.   3) Patient exhibits an increased in  hip strength from 3/5 to 4/5 to allow for Patient/Family/Caregiver was advised of these findings, precautions, and treatment options and has agreed to actively participate in planning and for this course of care.     Thank you for your referral. Please co-sign or sign and return this letter via fax Focus on the relationship between your breathing diaphragm and the pelvic floor muscles. As you breathe in, let the pelvic floor relax. As you exhale, tighten and contract the pelvic floor as if the muscles are helping the air leave your lungs.

## 2019-02-18 ENCOUNTER — APPOINTMENT (OUTPATIENT)
Dept: PHYSICAL THERAPY | Age: 79
End: 2019-02-18
Attending: INTERNAL MEDICINE
Payer: MEDICARE

## 2019-02-20 ENCOUNTER — OFFICE VISIT (OUTPATIENT)
Dept: PHYSICAL THERAPY | Age: 79
End: 2019-02-20
Attending: INTERNAL MEDICINE
Payer: MEDICARE

## 2019-02-20 PROCEDURE — 97530 THERAPEUTIC ACTIVITIES: CPT

## 2019-02-20 PROCEDURE — 97112 NEUROMUSCULAR REEDUCATION: CPT

## 2019-02-20 NOTE — PROGRESS NOTES
Dx:  Constipation by outlet dysfunction (K59.02)  Pelvic floor dysfunction (M62.89)         Authorized # of Visits:  2/12         Next MD visit: none scheduled  Fall Risk: standard         Precautions: n/a             Subjective: Drinking 5 oz.  Of water, 5 Santino would benefit from skilled Pelvic Physical Therapy for Diet modification to prevent Constipation;  Internal Rectal Biofeedback for EAS neuromuscular reeducation;  Training of defecation postures/ techniques to evacuate bowel completely; and progres 7) Patient understands importance of performing HEP to prevent reoccurrence of symptoms.       Plan: Patient will be seen for 1-2 x/week or a total of 12 visits over a 90 day period.  Treatment will include: Treatment will include: Neuromuscular re-educati ? The normal range of voiding urine is 6 to 8 times during a 24 hour period. As we get older, our bladder capacity can get smaller and we may need to pass urine more frequently but usually not more than every 2 hours. ?  Urine should flow easily without di ? Limit the amount of alcohol you drink. Alcohol increases urine production and also makes it difficult for the brain to coordinate bladder control. ? Avoid constipation by maintaining a balanced diet of dietary fiber.     Cigarette smoking is also irritat Prunes   FOOD PREPARATION  Dietary fiber can be reduced in foods during preparation and cooking. To retain fiber, serve fresh fruits and vegetables. When preparing food leave edible skins and seeds, and use whole-grain flours.       Grams of Fiber in Food P Total grams of fiber    Compare your total grams of fiber to the daily recommendation of   25-35 grams. For additional information on fiber content in foods go to www.caloriecounts. com

## 2019-02-25 ENCOUNTER — OFFICE VISIT (OUTPATIENT)
Dept: PHYSICAL THERAPY | Age: 79
End: 2019-02-25
Attending: INTERNAL MEDICINE
Payer: MEDICARE

## 2019-02-25 PROCEDURE — 97110 THERAPEUTIC EXERCISES: CPT

## 2019-02-25 PROCEDURE — 97112 NEUROMUSCULAR REEDUCATION: CPT

## 2019-02-25 PROCEDURE — 97530 THERAPEUTIC ACTIVITIES: CPT

## 2019-02-25 NOTE — PROGRESS NOTES
Dx:  Constipation by outlet dysfunction (K59.02)  Pelvic floor dysfunction (M62.89)         Authorized # of Visits:  3/12         Next MD visit: none scheduled  Fall Risk: standard         Precautions: n/a             Subjective:  Incomplete BM and nausea i Kary Staples would benefit from skilled Pelvic Physical Therapy for Diet modification to prevent Constipation;  Internal Rectal Biofeedback for EAS neuromuscular reeducation;  Training of defecation postures/ techniques to evacuate bowel completely; and weses 7) Patient understands importance of performing HEP to prevent reoccurrence of symptoms.       Plan: Patient will be seen for 1-2 x/week or a total of 12 visits over a 90 day period.  Treatment will include: Treatment will include: Neuromuscular re-educati Neuromuscular reeducation:  Instruction on DB and 5 audible count PFM contraction with IVB, Pelvic brace with hip accessory strengthening, Pelvic brace with sit to stand,  Therapeutic exercise:  Nu Step Pelvic brace for cardiovascular exercise    Charges: ? Maintain a good fluid intake. Depending on your body size and environment, drink 5-6 cups (8 oz each) of fluid per day unless otherwise advised by your doctor. Not enough fluid creates a foul odor and dark color of the urine. ?  Limit the amount of caffe A good source of dietary fiber contains at least 2 grams per serving. Be sure to eat foods that contain both soluble and insoluble fiber. Insoluble fiber includes foods that are not easily mashable   ? Fruits  ? Vegetables  ? Dried beans  ? Wheat bran  ? Pear (with peel) 1 medium 4.32   Prunes 3 3.5   Raspberries 1 cup 7.50   Strawberries 1 cup 3.87   Watermelon 1 slice 5.78   *For additional information on fiber content in foods go to www.MemberPasss. Zoyi   *Read Nutritional Facts labels on the foods you

## 2019-03-04 ENCOUNTER — OFFICE VISIT (OUTPATIENT)
Dept: PHYSICAL THERAPY | Age: 79
End: 2019-03-04
Attending: INTERNAL MEDICINE
Payer: MEDICARE

## 2019-03-04 PROCEDURE — 97110 THERAPEUTIC EXERCISES: CPT

## 2019-03-04 PROCEDURE — 97112 NEUROMUSCULAR REEDUCATION: CPT

## 2019-03-04 PROCEDURE — 97530 THERAPEUTIC ACTIVITIES: CPT

## 2019-03-04 NOTE — PROGRESS NOTES
Dx:  Constipation by outlet dysfunction (K59.02)  Pelvic floor dysfunction (M62.89)         Authorized # of Visits:  4/12         Next MD visit: none scheduled  Fall Risk: standard         Precautions: Parkinson's         Subjective:Able to have BM daily w Internal vaginal assessment revealed Postmenopausal Changes; Hemorrhoids;   Fecal matter on perineum/ pad; 2/5 Levator Ani weakness; 1/5 EAS weakness; and poor awareness of Coordination of Pelvic floor musculature resulting in FI.)     Alyse Matos would mialdyi 5) Patient reports change in [place] stool #3-4 to #4 with daily bowel moment without straining and prevention of further pelvic organ prolapse.    6)  Patient reports a change in Marinoff scale from 3 to 1-0 to allow for intercourse and pelvic exam with m IVB for awareness of DB then 5 count PFM contraction    5W, 10R x20 Constipation education  Belly Hard Belly Big with Squatty Potty Constipation education  Belly Hard Belly Big with Squatty Potty        Pelvic brace with sit to stand Pelvic brace with sit ? An increase in fiber should be accompanied with an increase in water. ? Eat less processed foods and more fresh foods. A good source of dietary fiber contains at least 2 grams per serving.  Be sure to eat foods that contain both soluble and insoluble Boysenberries 1 cup 7.20   Grapefruit 1 medium 3.61   Grapes 1 cup 1.12   Nectarine 1 medium 2.2   Orange 1 medium 3.14   Pear (with peel) 1 medium 4.32   Prunes 3 3.5   Raspberries 1 cup 7.50   Strawberries 1 cup 3.87   Watermelon 1 slice 3.41   *For nahun ? Imbalances in the diet (too much sugar and animal fat)  ? Sedentary lifestyle  ? Repeatedly ignoring the urge to have a BM  ? Slow movement of the stool – too much water absorption in the colon  ? Lifestyle changes, such as pregnancy and travel  ?  Kavin Gonzales Your body has a natural emptying reflex. Approximately ½ hour after eating a meal or drinking a hot beverage, a reflex occurs to increase motility or movement of the stool down to the rectum.  This reflex usually occurs in the mornings when trying to get yo ? The average bladder can hold about 2 cups of urine before it needs to be emptied. ? The normal range of voiding urine is 6 to 8 times during a 24 hour period.  As we get older, our bladder capacity can get smaller and we may need to pass urine more frequ ? Limit the amount of caffeine (coffee, cola, chocolate or tea) and citrus foods that you consume as these foods can be associated with increased sensation of urinary urgency and frequency.  See the handout [facility] Diet Can Affect Your Bladder” for more inform ? Brown rice  ? Whole grain products such as breads, cereals and pasta    Soluble fiber includes foods that are smooth and low residue  ? Fruits such as apples, oranges, pears, peaches  ? Vegetables  ? Seeds  ? Oat bran  ? Dried beans  ?  Oatmeal, barley an *Read Nutritional Facts labels on the foods you buy. Product may vary in fiber content. Fiber Intake Log  Use this log to record your daily fiber intake.    Meal Food Items Grams of Fiber   Breakfast                                   Lunch

## 2019-03-11 ENCOUNTER — OFFICE VISIT (OUTPATIENT)
Dept: PHYSICAL THERAPY | Age: 79
End: 2019-03-11
Attending: INTERNAL MEDICINE
Payer: MEDICARE

## 2019-03-11 PROCEDURE — 97112 NEUROMUSCULAR REEDUCATION: CPT

## 2019-03-11 PROCEDURE — 97530 THERAPEUTIC ACTIVITIES: CPT

## 2019-03-11 NOTE — PROGRESS NOTES
Discharge Summary  Dx:  Constipation by outlet dysfunction (K59.02)  Pelvic floor dysfunction (M62.89)         Authorized # of Visits:  5/12         Next MD visit: none scheduled  Fall Risk: standard         Precautions: Parkinson's         Subjective:  Pa Upon Orthopedic evaluation,  patient exhibits kyphosis; palpable Sacral stimulation unit; poor balance;  and vertical scar tissue adhesions and LE weakness resulting in wheeled walker ambulation.          Internal vaginal assessment revealed Postmenopausal 1) Patient instructed on Levator Ani contraction inverse to diaphragmatic breathing to allow for pelvic brace with ADLs without valsalva.    2) Patient exhibits an increase in Levator Ani/ External Anal Sphincter strength from 1-2/5 with 3 count hold to 2-3 Belly Hard Belly Big with Squatty Potty Constipation education  Belly Hard Belly Big with Squatty Potty       Pelvic brace with sit to stand Pelvic brace with sit to stand  And push up from chair arm rests        Nu step Pelvic Brace 8' Discussion of solub ? Eat less processed foods and more fresh foods. A good source of dietary fiber contains at least 2 grams per serving. Be sure to eat foods that contain both soluble and insoluble fiber. Insoluble fiber includes foods that are not easily mashable   ?  Fr Nectarine 1 medium 2.2   Orange 1 medium 3.14   Pear (with peel) 1 medium 4.32   Prunes 3 3.5   Raspberries 1 cup 7.50   Strawberries 1 cup 3.87   Watermelon 1 slice 7.01   *For additional information on fiber content in foods go to www.caloriecounts. com ? Repeatedly ignoring the urge to have a BM  ? Slow movement of the stool – too much water absorption in the colon  ? Lifestyle changes, such as pregnancy and travel  ? Laxative abuse    CAN MEDICATIONS CAUSE CONSTIPATION?   Yes, constipation can be caused Your body has a natural emptying reflex. Approximately ½ hour after eating a meal or drinking a hot beverage, a reflex occurs to increase motility or movement of the stool down to the rectum.  This reflex usually occurs in the mornings when trying to get yo ? The average bladder can hold about 2 cups of urine before it needs to be emptied. ? The normal range of voiding urine is 6 to 8 times during a 24 hour period.  As we get older, our bladder capacity can get smaller and we may need to pass urine more frequ ? Limit the amount of caffeine (coffee, cola, chocolate or tea) and citrus foods that you consume as these foods can be associated with increased sensation of urinary urgency and frequency.  See the handout [facility] Diet Can Affect Your Bladder” for more inform ? Brown rice  ? Whole grain products such as breads, cereals and pasta    Soluble fiber includes foods that are smooth and low residue  ? Fruits such as apples, oranges, pears, peaches  ? Vegetables  ? Seeds  ? Oat bran  ? Dried beans  ?  Oatmeal, barley an *Read Nutritional Facts labels on the foods you buy. Product may vary in fiber content. Fiber Intake Log  Use this log to record your daily fiber intake.    Meal Food Items Grams of Fiber   Breakfast                                   Lunch

## 2019-03-12 PROBLEM — G20 PARKINSON DISEASE (HCC): Status: ACTIVE | Noted: 2019-03-12

## 2019-03-12 PROBLEM — Z91.81 AT RISK FOR FALLING: Status: ACTIVE | Noted: 2019-03-12

## 2019-03-12 PROBLEM — G20.A1 PARKINSON DISEASE: Status: ACTIVE | Noted: 2019-03-12

## 2019-03-18 ENCOUNTER — APPOINTMENT (OUTPATIENT)
Dept: PHYSICAL THERAPY | Age: 79
End: 2019-03-18
Attending: INTERNAL MEDICINE
Payer: MEDICARE

## 2019-03-20 ENCOUNTER — TELEPHONE (OUTPATIENT)
Dept: PHYSICAL THERAPY | Age: 79
End: 2019-03-20

## 2019-03-22 ENCOUNTER — TELEPHONE (OUTPATIENT)
Dept: PHYSICAL THERAPY | Age: 79
End: 2019-03-22

## 2019-03-26 PROBLEM — M81.0 AGE-RELATED OSTEOPOROSIS WITHOUT CURRENT PATHOLOGICAL FRACTURE: Status: ACTIVE | Noted: 2019-03-26

## 2019-04-01 ENCOUNTER — APPOINTMENT (OUTPATIENT)
Dept: PHYSICAL THERAPY | Age: 79
End: 2019-04-01
Attending: INTERNAL MEDICINE
Payer: MEDICARE

## 2019-06-13 ENCOUNTER — OFFICE VISIT (OUTPATIENT)
Dept: INTERNAL MEDICINE CLINIC | Facility: CLINIC | Age: 79
End: 2019-06-13
Payer: MEDICARE

## 2019-06-13 VITALS
HEIGHT: 59.06 IN | BODY MASS INDEX: 23.59 KG/M2 | DIASTOLIC BLOOD PRESSURE: 76 MMHG | SYSTOLIC BLOOD PRESSURE: 112 MMHG | RESPIRATION RATE: 16 BRPM | HEART RATE: 76 BPM | WEIGHT: 117 LBS | TEMPERATURE: 98 F

## 2019-06-13 DIAGNOSIS — G89.29 CHRONIC ABDOMINAL PAIN: Primary | ICD-10-CM

## 2019-06-13 DIAGNOSIS — G89.29 CHRONIC BILATERAL LOW BACK PAIN WITHOUT SCIATICA: ICD-10-CM

## 2019-06-13 DIAGNOSIS — R11.0 CHRONIC NAUSEA: ICD-10-CM

## 2019-06-13 DIAGNOSIS — R10.9 CHRONIC ABDOMINAL PAIN: Primary | ICD-10-CM

## 2019-06-13 DIAGNOSIS — G20 PARKINSON DISEASE (HCC): ICD-10-CM

## 2019-06-13 DIAGNOSIS — M54.50 CHRONIC BILATERAL LOW BACK PAIN WITHOUT SCIATICA: ICD-10-CM

## 2019-06-13 PROCEDURE — 99213 OFFICE O/P EST LOW 20 MIN: CPT | Performed by: PHYSICIAN ASSISTANT

## 2019-06-13 NOTE — PROGRESS NOTES
Patient presents with: Follow - Up: SN Rm 2; F/U      HPI:  Pt presents for follow up. Biggest issue is no relief from chronic abd pain, nausea. Dr. Josefa SARGENT (referred by [facility], he is a functional abd pain specialist).   Was referred for pelvic floo Allergic rhinitis, cause unspecified     Lumbago     Restless legs syndrome (RLS)     Nausea alone     Pure hypercholesterolemia     Raynaud's syndrome     Major depressive disorder     Anxiety     Lumbar degenerative disc disease     Bulge of lumbar disc total) by mouth daily. Disp: 30 capsule Rfl: 12   rivastigmine 4.6 MG/24HR Transdermal Patch 24 Hr Place 1 patch onto the skin daily. Disp:  Rfl:    Desvenlafaxine Succinate  MG Oral Tablet 24 Hr Take 100 mg by mouth daily.  Disp:  Rfl:    Vitamin B-1 Patient Instructions on file for this visit. All questions were answered and the patient understands the plan.

## 2019-08-14 ENCOUNTER — TELEPHONE (OUTPATIENT)
Dept: INTERNAL MEDICINE CLINIC | Facility: CLINIC | Age: 79
End: 2019-08-14

## 2019-08-14 DIAGNOSIS — Z00.00 ROUTINE GENERAL MEDICAL EXAMINATION AT A HEALTH CARE FACILITY: Primary | ICD-10-CM

## 2019-08-14 DIAGNOSIS — M47.26 OSTEOARTHRITIS OF SPINE WITH RADICULOPATHY, LUMBAR REGION: ICD-10-CM

## 2019-08-14 DIAGNOSIS — Z13.0 SCREENING FOR BLOOD DISEASE: ICD-10-CM

## 2019-08-14 DIAGNOSIS — E78.00 PURE HYPERCHOLESTEROLEMIA: ICD-10-CM

## 2019-08-14 NOTE — TELEPHONE ENCOUNTER
Future Appointments   Date Time Provider Leon Maria De Jesus   9/17/2019 11:00 AM Krista Smith PA-C [facility] FLS Vincent Nap   11/11/2019  3:30 PM Jocy Dang MD [facility] IM   8/17/2020  1:15 PM [facility] Alas [facility]

## 2019-08-26 ENCOUNTER — MED REC SCAN ONLY (OUTPATIENT)
Dept: INTERNAL MEDICINE CLINIC | Facility: CLINIC | Age: 79
End: 2019-08-26

## 2019-08-29 ENCOUNTER — OFFICE VISIT (OUTPATIENT)
Dept: INTERNAL MEDICINE CLINIC | Facility: CLINIC | Age: 79
End: 2019-08-29
Payer: MEDICARE

## 2019-08-29 VITALS
RESPIRATION RATE: 16 BRPM | HEIGHT: 59.5 IN | SYSTOLIC BLOOD PRESSURE: 114 MMHG | OXYGEN SATURATION: 95 % | WEIGHT: 114 LBS | HEART RATE: 74 BPM | BODY MASS INDEX: 22.68 KG/M2 | DIASTOLIC BLOOD PRESSURE: 66 MMHG | TEMPERATURE: 98 F

## 2019-08-29 DIAGNOSIS — R11.0 CHRONIC NAUSEA: Primary | ICD-10-CM

## 2019-08-29 DIAGNOSIS — E78.5 DYSLIPIDEMIA: ICD-10-CM

## 2019-08-29 PROCEDURE — 99213 OFFICE O/P EST LOW 20 MIN: CPT | Performed by: PHYSICIAN ASSISTANT

## 2019-08-29 PROCEDURE — 93000 ELECTROCARDIOGRAM COMPLETE: CPT | Performed by: PHYSICIAN ASSISTANT

## 2019-08-29 NOTE — PROGRESS NOTES
Patient presents with:  Medical Clearance (constitutional): GI doctor is starting Pt on a new Rx that can be ordered via a [address] [place] [address] JDAVID is requesting EKG with med clearance       HPI:  Pt presents today for EKG and for \"clearance\" to take Domper pressure reading without diagnosis of hypertension     Allergic rhinitis, cause unspecified     Lumbago     Restless legs syndrome (RLS)     Nausea alone     Pure hypercholesterolemia     Raynaud's syndrome     Major depressive disorder     Anxiety     Lum 4.6 MG/24HR Transdermal Patch 24 Hr Place 1 patch onto the skin daily. Disp:  Rfl:    Desvenlafaxine Succinate  MG Oral Tablet 24 Hr Take 100 mg by mouth daily. Disp:  Rfl:    Vitamin B-12 250 MCG Oral Tab Take 250 mcg by mouth daily.  Disp:  Rfl: Juanita and I discussed the above matter and it is our opinion that Domitila Bob is at higho risk for CV complications if she were to pursue treatment with Domperidone.

## 2019-09-12 ENCOUNTER — PATIENT MESSAGE (OUTPATIENT)
Dept: INTERNAL MEDICINE CLINIC | Facility: CLINIC | Age: 79
End: 2019-09-12

## 2019-09-13 NOTE — TELEPHONE ENCOUNTER
From: Francia Bradford  To: Jose Yoo PA-C  Sent: 9/12/2019 9:06 AM CDT  Subject: Visit Follow-up Question    At my office visit on August 29th I talked to you about trying \"Domperidone\" for my chronic nausea and GI disorder.   This is at the [facility]

## 2019-09-30 ENCOUNTER — MED REC SCAN ONLY (OUTPATIENT)
Dept: INTERNAL MEDICINE CLINIC | Facility: CLINIC | Age: 79
End: 2019-09-30

## 2019-11-04 NOTE — TELEPHONE ENCOUNTER
Pts  called and stated that they went to get the pts lab work done and no orders were placed     Please advise     AWV is   Future Appointments   Date Time Provider Leon Pretty   11/11/2019  3:30 PM Tremayne Rouse MD [facility]

## 2019-11-04 NOTE — TELEPHONE ENCOUNTER
Orders entered through THE [facility]. Pt notified, instructed on fasting.  Verbalized understanding

## 2019-11-05 ENCOUNTER — LABORATORY ENCOUNTER (OUTPATIENT)
Dept: LAB | Age: 79
End: 2019-11-05
Attending: INTERNAL MEDICINE
Payer: MEDICARE

## 2019-11-05 DIAGNOSIS — E78.00 PURE HYPERCHOLESTEROLEMIA: ICD-10-CM

## 2019-11-05 DIAGNOSIS — M47.26 OSTEOARTHRITIS OF SPINE WITH RADICULOPATHY, LUMBAR REGION: ICD-10-CM

## 2019-11-05 DIAGNOSIS — Z13.0 SCREENING FOR BLOOD DISEASE: ICD-10-CM

## 2019-11-05 PROCEDURE — 36415 COLL VENOUS BLD VENIPUNCTURE: CPT

## 2019-11-05 PROCEDURE — 80053 COMPREHEN METABOLIC PANEL: CPT

## 2019-11-05 PROCEDURE — 85025 COMPLETE CBC W/AUTO DIFF WBC: CPT

## 2019-11-05 PROCEDURE — 80061 LIPID PANEL: CPT

## 2019-11-11 ENCOUNTER — OFFICE VISIT (OUTPATIENT)
Dept: INTERNAL MEDICINE CLINIC | Facility: CLINIC | Age: 79
End: 2019-11-11
Payer: MEDICARE

## 2019-11-11 VITALS
BODY MASS INDEX: 23.28 KG/M2 | OXYGEN SATURATION: 96 % | HEIGHT: 59.5 IN | WEIGHT: 117 LBS | SYSTOLIC BLOOD PRESSURE: 118 MMHG | TEMPERATURE: 98 F | DIASTOLIC BLOOD PRESSURE: 64 MMHG | RESPIRATION RATE: 16 BRPM | HEART RATE: 68 BPM

## 2019-11-11 DIAGNOSIS — M47.26 OSTEOARTHRITIS OF SPINE WITH RADICULOPATHY, LUMBAR REGION: ICD-10-CM

## 2019-11-11 DIAGNOSIS — Z00.00 ROUTINE GENERAL MEDICAL EXAMINATION AT A HEALTH CARE FACILITY: Primary | ICD-10-CM

## 2019-11-11 DIAGNOSIS — E78.5 DYSLIPIDEMIA: ICD-10-CM

## 2019-11-11 DIAGNOSIS — F32.2 CURRENT SEVERE EPISODE OF MAJOR DEPRESSIVE DISORDER WITHOUT PSYCHOTIC FEATURES WITHOUT PRIOR EPISODE (HCC): ICD-10-CM

## 2019-11-11 DIAGNOSIS — I73.00 RAYNAUD'S DISEASE WITHOUT GANGRENE: ICD-10-CM

## 2019-11-11 DIAGNOSIS — F41.9 ANXIETY: ICD-10-CM

## 2019-11-11 DIAGNOSIS — E78.00 PURE HYPERCHOLESTEROLEMIA: ICD-10-CM

## 2019-11-11 DIAGNOSIS — G20 PARKINSON DISEASE (HCC): ICD-10-CM

## 2019-11-11 DIAGNOSIS — D72.820 LYMPHOCYTOSIS: ICD-10-CM

## 2019-11-11 PROCEDURE — 99213 OFFICE O/P EST LOW 20 MIN: CPT | Performed by: INTERNAL MEDICINE

## 2019-11-11 PROCEDURE — 90662 IIV NO PRSV INCREASED AG IM: CPT | Performed by: INTERNAL MEDICINE

## 2019-11-11 PROCEDURE — G0008 ADMIN INFLUENZA VIRUS VAC: HCPCS | Performed by: INTERNAL MEDICINE

## 2019-11-11 PROCEDURE — G0439 PPPS, SUBSEQ VISIT: HCPCS | Performed by: INTERNAL MEDICINE

## 2019-11-13 NOTE — PROGRESS NOTES
HPI:    Patient ID: Keily Foss is a [de-identified]year old female.     HPI  Here for awv, feeling ok, struggling with parkinsons and depression, follows with psych, osteoporosis on prolia  /64 (BP Location: Right arm, Patient Position: Sitting, Cuff Size Tablet Dispersible Take 4 mg by mouth every 8 (eight) hours as needed for Nausea. Allergies:  Prochlorperazine            Comment:Other reaction(s):  Other (See Comments)             may worsen symptoms of parkinsonism  Allergy                     Com

## 2019-12-11 DIAGNOSIS — E78.00 PURE HYPERCHOLESTEROLEMIA: ICD-10-CM

## 2019-12-11 RX ORDER — SIMVASTATIN 20 MG
TABLET ORAL
Qty: 90 TABLET | Refills: 0 | Status: SHIPPED | OUTPATIENT
Start: 2019-12-11 | End: 2020-04-22

## 2020-04-22 DIAGNOSIS — E78.00 PURE HYPERCHOLESTEROLEMIA: ICD-10-CM

## 2020-04-22 RX ORDER — SIMVASTATIN 20 MG
20 TABLET ORAL NIGHTLY
Qty: 90 TABLET | Refills: 1 | Status: SHIPPED | OUTPATIENT
Start: 2020-04-22 | End: 2021-02-02

## 2020-04-22 RX ORDER — SIMVASTATIN 20 MG
TABLET ORAL
Qty: 90 TABLET | Refills: 0 | OUTPATIENT
Start: 2020-04-22

## 2020-05-21 ENCOUNTER — OFFICE VISIT (OUTPATIENT)
Dept: PODIATRY CLINIC | Facility: CLINIC | Age: 80
End: 2020-05-21
Payer: MEDICARE

## 2020-05-21 VITALS — TEMPERATURE: 98 F

## 2020-05-21 DIAGNOSIS — M19.071 PRIMARY OSTEOARTHRITIS OF RIGHT FOOT: ICD-10-CM

## 2020-05-21 DIAGNOSIS — M20.5X1 HALLUX LIMITUS OF RIGHT FOOT: Primary | ICD-10-CM

## 2020-05-21 DIAGNOSIS — B35.1 ONYCHOMYCOSIS: ICD-10-CM

## 2020-05-21 PROCEDURE — 11721 DEBRIDE NAIL 6 OR MORE: CPT | Performed by: PODIATRIST

## 2020-05-21 NOTE — PROGRESS NOTES
Bill Chin is a [de-identified]year old female. Patient presents with:  Toenail Care: LOV was about a year ago - nail and callus trimming - patient unable to trim them herself.   Notices some enlargement of the great toe joint right foot    HPI:     Patient pre Lymphocytosis (symptomatic) 10/2008   • Neuropathy     bilateral feet d/t chemotherapy   • Osteopenia    • OTHER DISEASES 2008    lymphocytic colitis found on colonoscopy done for history of chronic diarrhea   • Ovarian cancer ([facility] [address] 2004   • Parkinson's di INJECTION OR MEDIAL BRANCH NERVE BLOCK Bilateral 4/19/2018    Performed by Norbert Mattson MD at [address] N/A 3/5/2013    [facility]  [facility]        Comment: Cage done  3-26-18      Drug use: No      Sexual activity: Yes    Other Topics      Concerns:        Exercise: Yes          fitness class 1x per week          REVIEW OF SYSTEMS:   Review of Systems    Today reviewed systems as docu KAREN  5/21/2020

## 2020-08-07 ENCOUNTER — TELEPHONE (OUTPATIENT)
Dept: INTERNAL MEDICINE CLINIC | Facility: CLINIC | Age: 80
End: 2020-08-07

## 2020-08-07 NOTE — TELEPHONE ENCOUNTER
Pt called to schedule Pre-op.  Appt scheduled for   Future Appointments   Date Time Provider Leon Maria De Jesus   8/24/2020 10:40 Kle Trinidad MD [facility]            Surgery date: 9/10/2020  Surgeon: Dr Deuce Higgins  Ph#[phone]  Fax# [phone]

## 2020-08-24 ENCOUNTER — OFFICE VISIT (OUTPATIENT)
Dept: INTERNAL MEDICINE CLINIC | Facility: CLINIC | Age: 80
End: 2020-08-24
Payer: MEDICARE

## 2020-08-24 VITALS
TEMPERATURE: 98 F | RESPIRATION RATE: 16 BRPM | WEIGHT: 111 LBS | HEIGHT: 60 IN | HEART RATE: 72 BPM | SYSTOLIC BLOOD PRESSURE: 112 MMHG | DIASTOLIC BLOOD PRESSURE: 78 MMHG | BODY MASS INDEX: 21.79 KG/M2

## 2020-08-24 DIAGNOSIS — Z01.818 PREOP EXAMINATION: Primary | ICD-10-CM

## 2020-08-24 DIAGNOSIS — K44.9 HIATAL HERNIA: ICD-10-CM

## 2020-08-24 DIAGNOSIS — G20 PARKINSON DISEASE (HCC): ICD-10-CM

## 2020-08-24 DIAGNOSIS — E78.5 DYSLIPIDEMIA: ICD-10-CM

## 2020-08-24 PROCEDURE — 99214 OFFICE O/P EST MOD 30 MIN: CPT | Performed by: INTERNAL MEDICINE

## 2020-08-24 NOTE — PROGRESS NOTES
[facility]  PRE OP RISK ASSESSMENT    REASON FOR CONSULT: Pre-op risk assessment for surgical procedure hiatal hernia repair planned for 9/7/2020 with general anesthesia.     REQUESTING PHYSICIAN: Leif Samaniego MD    CHIEF COMPLAINT: Patient presents wi Performed by Adamaris Beebe MD at [facility] ENDOSCOPY   • ESOPHAGOGASTRODUODENOSCOPY (EGD) N/A 2/28/2018    Performed by Nicole Godfrey MD at [facility] ENDOSCOPY   • ESOPHAGOGASTRODUODENOSCOPY (EGD) N/A 1/4/2017    Performed by Adamaris Beebe MD at [facility] ENDOSCOP Teresa Ramos MD at [address] [facility] LEAD N/A 11/5/2018    Performed by Teresa Ramos MD at [facility] [address]  9/20/2018    Performed by Teresa Ramos MD at [address] [place] [address] file      Food insecurity:        Worry: Not on file        Inability: Not on file      Transportation needs:        Medical: Not on file        Non-medical: Not on file    Tobacco Use      Smoking status: Former Smoker        Packs/day: 0.20        Years: Self 64        REVIEW OF SYSTEMS:  GENERAL: feels well otherwise  SKIN: denies any unusual skin lesions  HEENT: denies blurred vision or double vision denies nasal congestion  LUNGS: denies shortness of breath with exertion  CARDIOVASCULAR: denies chest pa for statin therapy. - LIPID PANEL; Future  - COMP METABOLIC PANEL (14); Future    4.  Parkinson disease ([facility] [address]  Stable  Following with neurology service/movement specialist   Continue current management      Preop examination  (primary encounter diagnosis)

## 2020-11-02 ENCOUNTER — LAB ENCOUNTER (OUTPATIENT)
Dept: LAB | Age: 80
End: 2020-11-02
Attending: INTERNAL MEDICINE
Payer: MEDICARE

## 2020-11-02 DIAGNOSIS — E78.5 DYSLIPIDEMIA: ICD-10-CM

## 2020-11-02 DIAGNOSIS — K86.2 PANCREAS CYST: ICD-10-CM

## 2020-11-02 PROCEDURE — 80053 COMPREHEN METABOLIC PANEL: CPT

## 2020-11-02 PROCEDURE — 80061 LIPID PANEL: CPT

## 2020-11-02 PROCEDURE — 36415 COLL VENOUS BLD VENIPUNCTURE: CPT

## 2020-11-03 ENCOUNTER — IMMUNIZATION (OUTPATIENT)
Dept: INTERNAL MEDICINE CLINIC | Facility: CLINIC | Age: 80
End: 2020-11-03
Payer: MEDICARE

## 2020-11-03 DIAGNOSIS — Z23 NEED FOR VACCINATION: ICD-10-CM

## 2020-11-03 PROCEDURE — G0008 ADMIN INFLUENZA VIRUS VAC: HCPCS | Performed by: INTERNAL MEDICINE

## 2020-11-03 PROCEDURE — 90662 IIV NO PRSV INCREASED AG IM: CPT | Performed by: INTERNAL MEDICINE

## 2020-11-04 ENCOUNTER — TELEPHONE (OUTPATIENT)
Dept: INTERNAL MEDICINE CLINIC | Facility: CLINIC | Age: 80
End: 2020-11-04

## 2020-11-04 DIAGNOSIS — Z00.00 ROUTINE GENERAL MEDICAL EXAMINATION AT A HEALTH CARE FACILITY: ICD-10-CM

## 2020-11-04 DIAGNOSIS — Z13.29 SCREENING FOR THYROID DISORDER: ICD-10-CM

## 2020-11-04 DIAGNOSIS — E78.00 PURE HYPERCHOLESTEROLEMIA: Primary | ICD-10-CM

## 2020-11-04 DIAGNOSIS — Z13.0 SCREENING FOR DISORDER OF BLOOD AND BLOOD-FORMING ORGANS: ICD-10-CM

## 2020-11-04 NOTE — TELEPHONE ENCOUNTER
Wellness   Future Appointments   Date Time Provider Leon Pretty   12/21/2020  8:30 AM REFERENCE [id number]   12/28/2020  3:20 PM Greer Díaz MD [facility]     Orders to THE [facility]  aware must fast no call back required

## 2020-11-12 NOTE — TELEPHONE ENCOUNTER
CMP, LIPID done during 11/02/20, additional labs pending.  Please advise JL    Future Appointments   Date Time Provider Leon Pretty   12/21/2020  8:30 AM REFERENCE [id number]   12/28/2020  3:20 PM David Bradley MD [facility] EMG

## 2020-11-13 ENCOUNTER — PATIENT MESSAGE (OUTPATIENT)
Dept: INTERNAL MEDICINE CLINIC | Facility: CLINIC | Age: 80
End: 2020-11-13

## 2020-11-13 NOTE — TELEPHONE ENCOUNTER
From: Kamille Ruiz  To: Bard Cogan  Sent: 11/13/2020 8:48 AM CST  Subject: Lab Orders    Louise,    There are pending labs to be completed prior to your office visit. These are non fasting labs.  You will need to schedule an appointment with the lab

## 2020-11-30 ENCOUNTER — APPOINTMENT (OUTPATIENT)
Dept: LAB | Age: 80
End: 2020-11-30
Attending: INTERNAL MEDICINE
Payer: MEDICARE

## 2020-11-30 DIAGNOSIS — R11.0 NAUSEA: ICD-10-CM

## 2020-12-03 ENCOUNTER — HOSPITAL ENCOUNTER (OUTPATIENT)
Facility: HOSPITAL | Age: 80
Setting detail: HOSPITAL OUTPATIENT SURGERY
Discharge: HOME | End: 2020-12-03
Attending: INTERNAL MEDICINE | Admitting: INTERNAL MEDICINE
Payer: MEDICARE

## 2020-12-03 ENCOUNTER — ANESTHESIA (OUTPATIENT)
Dept: ENDOSCOPY | Facility: HOSPITAL | Age: 80
End: 2020-12-03
Payer: MEDICARE

## 2020-12-03 ENCOUNTER — ANESTHESIA EVENT (OUTPATIENT)
Dept: ENDOSCOPY | Facility: HOSPITAL | Age: 80
End: 2020-12-03
Payer: MEDICARE

## 2020-12-03 VITALS
HEIGHT: 60 IN | SYSTOLIC BLOOD PRESSURE: 162 MMHG | DIASTOLIC BLOOD PRESSURE: 133 MMHG | OXYGEN SATURATION: 100 % | WEIGHT: 105 LBS | TEMPERATURE: 98 F | BODY MASS INDEX: 20.62 KG/M2 | RESPIRATION RATE: 22 BRPM | HEART RATE: 65 BPM

## 2020-12-03 DIAGNOSIS — R13.19 ESOPHAGEAL DYSPHAGIA: ICD-10-CM

## 2020-12-03 DIAGNOSIS — R11.0 NAUSEA: Primary | ICD-10-CM

## 2020-12-03 PROCEDURE — 0DB48ZX EXCISION OF ESOPHAGOGASTRIC JUNCTION, VIA NATURAL OR ARTIFICIAL OPENING ENDOSCOPIC, DIAGNOSTIC: ICD-10-PCS | Performed by: INTERNAL MEDICINE

## 2020-12-03 PROCEDURE — 88305 TISSUE EXAM BY PATHOLOGIST: CPT | Performed by: INTERNAL MEDICINE

## 2020-12-03 PROCEDURE — 0D738ZZ DILATION OF LOWER ESOPHAGUS, VIA NATURAL OR ARTIFICIAL OPENING ENDOSCOPIC: ICD-10-PCS | Performed by: INTERNAL MEDICINE

## 2020-12-03 RX ORDER — SODIUM CHLORIDE, SODIUM LACTATE, POTASSIUM CHLORIDE, CALCIUM CHLORIDE 600; 310; 30; 20 MG/100ML; MG/100ML; MG/100ML; MG/100ML
INJECTION, SOLUTION INTRAVENOUS CONTINUOUS
Status: DISCONTINUED | OUTPATIENT
Start: 2020-12-03 | End: 2020-12-03

## 2020-12-03 RX ORDER — LIDOCAINE HYDROCHLORIDE 10 MG/ML
INJECTION, SOLUTION EPIDURAL; INFILTRATION; INTRACAUDAL; PERINEURAL AS NEEDED
Status: DISCONTINUED | OUTPATIENT
Start: 2020-12-03 | End: 2020-12-03 | Stop reason: SURG

## 2020-12-03 RX ADMIN — LIDOCAINE HYDROCHLORIDE 70 MG: 10 INJECTION, SOLUTION EPIDURAL; INFILTRATION; INTRACAUDAL; PERINEURAL at 09:27:00

## 2020-12-03 NOTE — ANESTHESIA POSTPROCEDURE EVALUATION
[address] [place] [address] Patient Status:  Hospital Outpatient Surgery   Age/Gender 79year old female MRN [id number]   [facility] ENDOSCOPY Attending Joshua Wagner MD   Hosp Day # 0 PCP Janyce Krabbe, MD       Anesthesia Post

## 2020-12-03 NOTE — ANESTHESIA PREPROCEDURE EVALUATION
PRE-OP EVALUATION    Patient Name: Marie Sanders    Pre-op Diagnosis: Nausea [R11.0]  Esophageal dysphagia [R13.10]    Procedure(s):  ESOPHAGOGASTRODUODENOSCOPY WITH POSSIBLE DILATION    Surgeon(s) and Role:     * Yazan Ng MD - Primary    Pre- Allergy, and Amoxicillin      Anesthesia Evaluation    Patient summary reviewed.     Anesthetic Complications           GI/Hepatic/Renal      (+) GERD                           Cardiovascular                     (+) hyperlipidemia Performed by Chandana Gan MD at [address] Bilateral 4/19/2018    Performed by Chandana Gan MD at [address] [facility] AUTHORITY 11/02/2020    K 4.2 11/02/2020     11/02/2020    CO2 34.0 (H) 11/02/2020    BUN 29 (H) 11/02/2020    CREATSERUM 0.94 11/02/2020     (H) 11/02/2020    CA 9.2 11/02/2020    CA 9.6 10/26/2020            Airway      Mallampati: II  Mouth opening:

## 2020-12-03 NOTE — OPERATIVE REPORT
[facility] OPERATIVE REPORT   PATIENT NAME: Sancoh Nieves  MRN: [id number]  DATE OF OPERATION: 12/3/2020  PREOPERATIVE DIAGNOSIS:   Postoperative dysphagia    POSTOPERATIVE DIAGNOSES:    Normal duodenum    Postoperative lunsford Continue current medications    Veverly MD Paul

## 2020-12-12 NOTE — PROGRESS NOTES
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     This is very rarely a precanc

## 2020-12-21 ENCOUNTER — TELEPHONE (OUTPATIENT)
Dept: INTERNAL MEDICINE CLINIC | Facility: CLINIC | Age: 80
End: 2020-12-21

## 2020-12-21 ENCOUNTER — LAB ENCOUNTER (OUTPATIENT)
Dept: LAB | Age: 80
End: 2020-12-21
Attending: INTERNAL MEDICINE
Payer: MEDICARE

## 2020-12-21 DIAGNOSIS — G20 PARKINSON DISEASE (HCC): ICD-10-CM

## 2020-12-21 DIAGNOSIS — G20 PARKINSON DISEASE (HCC): Primary | ICD-10-CM

## 2020-12-21 PROCEDURE — 85025 COMPLETE CBC W/AUTO DIFF WBC: CPT

## 2020-12-21 PROCEDURE — 36415 COLL VENOUS BLD VENIPUNCTURE: CPT

## 2020-12-21 NOTE — TELEPHONE ENCOUNTER
Pt had CMP and Lipid done 11/2/20    Pending CBC, please advise if pt needs/due for labs prior to upcoming appt.

## 2020-12-21 NOTE — TELEPHONE ENCOUNTER
Pt's here for labs, no orders in the system. High TE. Pt waiting in waiting room. Orders to [facility] . Pt aware to get labs done no sooner than 2 weeks prior to the appt. Pt aware to fast.  No call back required.     Future Appointments   Date Time Provi

## 2020-12-28 ENCOUNTER — OFFICE VISIT (OUTPATIENT)
Dept: INTERNAL MEDICINE CLINIC | Facility: CLINIC | Age: 80
End: 2020-12-28
Payer: MEDICARE

## 2020-12-28 VITALS
HEART RATE: 64 BPM | BODY MASS INDEX: 20.62 KG/M2 | WEIGHT: 105 LBS | HEIGHT: 60 IN | SYSTOLIC BLOOD PRESSURE: 102 MMHG | RESPIRATION RATE: 16 BRPM | DIASTOLIC BLOOD PRESSURE: 62 MMHG | TEMPERATURE: 97 F

## 2020-12-28 DIAGNOSIS — E78.00 PURE HYPERCHOLESTEROLEMIA: Primary | ICD-10-CM

## 2020-12-28 DIAGNOSIS — M81.0 AGE-RELATED OSTEOPOROSIS WITHOUT CURRENT PATHOLOGICAL FRACTURE: ICD-10-CM

## 2020-12-28 DIAGNOSIS — G20 PARKINSON DISEASE (HCC): ICD-10-CM

## 2020-12-28 DIAGNOSIS — Z91.81 AT RISK FOR FALLING: ICD-10-CM

## 2020-12-28 DIAGNOSIS — M54.16 LUMBAR RADICULITIS: ICD-10-CM

## 2020-12-28 DIAGNOSIS — F41.9 ANXIETY: ICD-10-CM

## 2020-12-28 DIAGNOSIS — Z00.00 ROUTINE GENERAL MEDICAL EXAMINATION AT A HEALTH CARE FACILITY: ICD-10-CM

## 2020-12-28 PROCEDURE — 99213 OFFICE O/P EST LOW 20 MIN: CPT | Performed by: INTERNAL MEDICINE

## 2020-12-28 PROCEDURE — G0439 PPPS, SUBSEQ VISIT: HCPCS | Performed by: INTERNAL MEDICINE

## 2020-12-29 NOTE — PROGRESS NOTES
HPI:    Patient ID: Bill Chin is a 79year old female.     HPI  Here for awv, please see awv form scanned in for details, parkinson's dx followed at [facility], hyperchol she stopped her statin on her own, chol now high, willing to restart, co occ mild viji 1.5 tablets by mouth 3 (three) times daily. 180 tablet 0   • CALCIUM + D 600-200 MG-UNIT OR TABS 2 TABLET DAILY       Allergies:  Prochlorperazine            Comment:Other reaction(s):  Other (See Comments)             may worsen symptoms of parkinsonism

## 2021-01-05 PROBLEM — M16.11 PRIMARY LOCALIZED OSTEOARTHRITIS OF RIGHT HIP: Status: ACTIVE | Noted: 2021-01-05

## 2021-01-05 PROBLEM — M16.12 PRIMARY OSTEOARTHRITIS OF LEFT HIP: Status: ACTIVE | Noted: 2021-01-05

## 2021-01-08 NOTE — H&P
Primary Care Provider : Maryjane Engel MD      HPI:  Melissa Hernandez is a 79year old female. 9/28/1940.   Patient presents with:  Checkup: f/u after surgery, not feeling good         Phu Higuera is here today for a follow up visit to discuss nausea     S Performed by Sudha Cody MD at [facility] ENDOSCOPY   • ENDOSCOPIC ULTRASOUND (EUS) N/A 4/26/2016     Performed by Sudha Cody MD at [facility] ENDOSCOPY   • ESOPHAGOGASTRODUODENOSCOPY (EGD) N/A 2/28/2018     Performed by Ivis Drummond MD at [facility] ENDOSCOPY SHOULDER SURG PROC UNLISTED   1999     Bone spurs removed (Rt shoulder)   • SPINAL CORD STIMULATOR N/A 12/8/2018     Performed by Bill Swenson MD at [address] [place] [address] Nw LEAD N/A 11/5/2018     Performed by Bill Swenson  MG Oral Tab Take 1.5 tablets by mouth 3 (three) times daily.    180 tablet 0   • CALCIUM + D 600-200 MG-UNIT OR TABS 2 TABLET DAILY                REVIEW OF SYSTEMS:   GENERAL: feels well otherwise  SKIN: denies any unusual skin lesions  EYES: denies hernia  with primary repair and Nissen fundoplication. SURGEON: Mat Summers MD    ASSISTANT: Isaias Luke PA-C. Please note this operation could not  be completed without Ms. Aguilera's assistance.  She was essential for  placement, positioning, padding, through the  umbilicus and a CO2 pneumoperitoneum created to 15 mmHg, followed by a  5 mm trocar just to the left of midline where inspection of the abdomen  reveals numerous adhesions of the small bowel to the lower midline  abdominal incision, but nowher An 0 Ethibond was utilized to suture the anterior stomach to  the fundus without incorporating the esophagus. Penrose drain and  bougie were removed. The repair is snug but not tight.  My findings  were related to the  and the trocar greater than 5 m

## 2021-01-29 ENCOUNTER — TELEPHONE (OUTPATIENT)
Dept: OBGYN CLINIC | Facility: CLINIC | Age: 81
End: 2021-01-29

## 2021-02-02 DIAGNOSIS — E78.00 PURE HYPERCHOLESTEROLEMIA: ICD-10-CM

## 2021-02-02 RX ORDER — SIMVASTATIN 20 MG
TABLET ORAL
Qty: 90 TABLET | Refills: 1 | Status: SHIPPED | OUTPATIENT
Start: 2021-02-02 | End: 2021-09-27

## 2021-03-05 DIAGNOSIS — Z23 NEED FOR VACCINATION: ICD-10-CM

## 2021-06-08 ENCOUNTER — TELEPHONE (OUTPATIENT)
Dept: INTERNAL MEDICINE CLINIC | Facility: CLINIC | Age: 81
End: 2021-06-08

## 2021-06-08 NOTE — TELEPHONE ENCOUNTER
Pts  called and stated that he needs to make an appt for his wife to discuss palliative care to address chronic stomach and back pain due to parkinsons and spinal arthritis     Pts  advised that PCP is JUDE but she has also seen CB and AD in the past.     I did schedule her   Future Appointments   Date Time Provider Leon Maria De Jesus   6/17/2021  1:20 PM Anthony Stoll MD [facility]     Please advise if this is OK or if she needs to be r/s with JL or CB.  If pt needs to r/s please advise where we can schedule her

## 2021-06-08 NOTE — TELEPHONE ENCOUNTER
Pts  didn't say that it needed to be done by a certain date but would like to try and start this process sooner due to the pts quality of life not being very good at this time due to the pain     JL please advise

## 2021-06-08 NOTE — TELEPHONE ENCOUNTER
I think a discussion to move to palliative care would be most appropriate to take place with JL. However, if needs to happen soon and pending availability I could facilitate (40-minute office visit).

## 2021-06-09 NOTE — TELEPHONE ENCOUNTER
Spoke to pt and pts daughter renan and adilene appt with JL   Future Appointments   Date Time Provider Leon Pretty   6/14/2021 11:00 AM Maya Iniguez MD [facility]

## 2021-06-14 ENCOUNTER — OFFICE VISIT (OUTPATIENT)
Dept: INTERNAL MEDICINE CLINIC | Facility: CLINIC | Age: 81
End: 2021-06-14
Payer: MEDICARE

## 2021-06-14 VITALS
RESPIRATION RATE: 16 BRPM | SYSTOLIC BLOOD PRESSURE: 122 MMHG | WEIGHT: 93 LBS | HEIGHT: 60 IN | TEMPERATURE: 98 F | HEART RATE: 68 BPM | DIASTOLIC BLOOD PRESSURE: 78 MMHG | BODY MASS INDEX: 18.26 KG/M2

## 2021-06-14 DIAGNOSIS — F33.41 RECURRENT MAJOR DEPRESSIVE DISORDER, IN PARTIAL REMISSION (HCC): ICD-10-CM

## 2021-06-14 DIAGNOSIS — G20 PARKINSON DISEASE (HCC): Primary | ICD-10-CM

## 2021-06-14 DIAGNOSIS — M54.50 ACUTE BILATERAL LOW BACK PAIN WITHOUT SCIATICA: ICD-10-CM

## 2021-06-14 PROCEDURE — 99213 OFFICE O/P EST LOW 20 MIN: CPT | Performed by: INTERNAL MEDICINE

## 2021-06-14 NOTE — PROGRESS NOTES
HPI/Subjective:   Patient ID: Olivia Prieto is a 79year old female.     HPI  Here for discussion about palliative care and depression, pt with long standing Parkinsons, mild dementia, depression, chronic abd pain and occ nausea, seeing gi, neuro and p RASH    Objective:   Physical Exam  Vitals reviewed. HENT:      Head: Atraumatic. Cardiovascular:      Rate and Rhythm: Regular rhythm. Heart sounds: Normal heart sounds. Pulmonary:      Breath sounds: Normal breath sounds.    Skin:     General:

## 2021-06-23 ENCOUNTER — TELEPHONE (OUTPATIENT)
Dept: HEMATOLOGY/ONCOLOGY | Facility: HOSPITAL | Age: 81
End: 2021-06-23

## 2021-06-23 NOTE — TELEPHONE ENCOUNTER
----- Message from Gurpreet Velasquez sent at 6/23/2021 12:27 PM CDT -----  See Theola Clear note below. Pt needs a consult.     Thanks  Lucy Vidal  ----- Message -----  From: JENNIFER Keller  Sent: 6/20/2021   9:37 PM CDT  To: Karyna Henley MA    Can you schedule he

## 2021-06-28 ENCOUNTER — OFFICE VISIT (OUTPATIENT)
Dept: HEMATOLOGY/ONCOLOGY | Facility: HOSPITAL | Age: 81
End: 2021-06-28
Attending: NURSE PRACTITIONER
Payer: MEDICARE

## 2021-06-28 VITALS
OXYGEN SATURATION: 97 % | DIASTOLIC BLOOD PRESSURE: 88 MMHG | BODY MASS INDEX: 19.83 KG/M2 | HEART RATE: 66 BPM | RESPIRATION RATE: 16 BRPM | HEIGHT: 60 IN | TEMPERATURE: 97 F | WEIGHT: 101 LBS | SYSTOLIC BLOOD PRESSURE: 155 MMHG

## 2021-06-28 DIAGNOSIS — F41.8 DEPRESSION WITH ANXIETY: ICD-10-CM

## 2021-06-28 DIAGNOSIS — R52 PAIN: Primary | ICD-10-CM

## 2021-06-28 DIAGNOSIS — R11.0 NAUSEA: ICD-10-CM

## 2021-06-28 DIAGNOSIS — R63.0 LACK OF APPETITE: ICD-10-CM

## 2021-06-28 PROBLEM — Z51.5 PALLIATIVE CARE BY SPECIALIST: Status: ACTIVE | Noted: 2021-06-28

## 2021-06-28 PROCEDURE — 99205 OFFICE O/P NEW HI 60 MIN: CPT | Performed by: NURSE PRACTITIONER

## 2021-06-28 PROCEDURE — G2212 PROLONG OUTPT/OFFICE VIS: HCPCS | Performed by: NURSE PRACTITIONER

## 2021-06-28 RX ORDER — ONDANSETRON 4 MG/1
4 TABLET, ORALLY DISINTEGRATING ORAL EVERY 8 HOURS PRN
Qty: 30 TABLET | Refills: 1 | Status: SHIPPED | OUTPATIENT
Start: 2021-06-28

## 2021-06-28 RX ORDER — MIRTAZAPINE 7.5 MG/1
7.5 TABLET, FILM COATED ORAL NIGHTLY
Qty: 30 TABLET | Refills: 0 | Status: SHIPPED | OUTPATIENT
Start: 2021-06-28 | End: 2021-07-29

## 2021-06-29 NOTE — CONSULTS
Palliative Care Consult Note     Patient Name: Sharla Chacon   YOB: 1940   Medical Record Number: [id number]   CSN: [id number]   Date of visit: 6/28/2021     Reason for Consult: Referred by Dr. Em See for Symptom management and goals syndrome     Major depressive disorder     Anxiety     Lumbar degenerative disc disease     Bulge of lumbar disc without myelopathy     Myofascial pain     Lumbar spondylosis     Malignant neoplasm of ovary (HCC)     Left hip pain     Pathologic fracture o glasses     Surgical History:  Past Surgical History:   Procedure Laterality Date   • CHOLECYSTECTOMY  1994   • COLONOSCOPY  2005, 10/2/08    Frankie Mooney MD   • COLONOSCOPY  4/8/13   • COLONOSCOPY  8/19/2014    Procedure: COLONOSCOPY;  Surgeon: Luis Conrad L/S 2 LEV  3/5/2013    Procedure: LUMBAR FACET INJECTION OR MEDIAL BRANCH NERVE BLOCK;  Surgeon: Racheal Rosado MD;  Location: [address] [facility] [address] PAIN MANAGEMENT   • INJ PARAVERT F JNT L/S 3 LEV  3/5/2013    Procedure: LUMBAR FACET INJECTION OR MEDIAL BRANCH 2/14/2013    Procedure: LUMBAR FACET INJECTION OR MEDIAL BRANCH NERVE BLOCK;  Surgeon: Olivier Gallagher MD;  Location: [address] [place] [address] Dr PAIN MANAGEMENT   • PATIENT DOCUMENTED NOT TO HAVE EXPERIENCED ANY OF THE FOLLOWING EVENTS  3/5/2013    Procedure: LUMBAR F SITE INFECTION PROPHYLAXIS.  N/A 8/25/2015    Procedure: CAUDAL EPIDURAL STEROID INJECTION;  Surgeon: Amber Moran MD;  Location: [facility]   • PORT, INDWELLING, IMP     • SHOULDER SURG PROC UNLISTED  1999    Bone spurs removed (Rt shoulder)   • U of Systems:  General:  Fatigue. Feels well. Respiratory:  Denies SOB, denies cough  Cardiac:  Denies chest pain, heart palpitations  Abdomen:  Denies constipation, diarrhea. Denies pain. Psych:  No complaints.   Sleeping well    Palliative Performan Coordination:   minutes      My total time spent caring for the patient on the day of the encounter: 90 minutes.         Electronically Signed by:  JENNIFER Vidal   THE [facility] Outpatient Palliative Nurse Practitioner

## 2021-07-30 NOTE — PROGRESS NOTES
Palliative Care Follow Up Note     Patient Name: Nishi Lucas   YOB: 1940   Medical Record Number: [id number]   CSN: [id number]   Date of visit: 6/28/2021       Chief Complaint/Reason for Visit:  Pain and appetite     History of Hammad ovary (HCC)     Left hip pain     Pathologic fracture of vertebrae     Ovarian cancer ([facility] [address]     Pancreas cyst     Colitis, collagenous     Adenomatous colon polyp     Chronic left-sided low back pain without sciatica     Chronic low back pain     Other petty 8/19/2014    Procedure: COLONOSCOPY;  Surgeon: Ronnie Kunz MD;  Location: [address] [place] [address] ENDOSCOPY   • COLONOSCOPY  4/29/16    [facility] GI- DR Jeanne Lowery- Repeat in 5 yrs   • DESTROY L/S FACET JNT ADDL  7/29/2013    Procedure: Ether Ioana;  Surgeon: Leonardo Mark Procedure: LUMBAR FACET INJECTION OR MEDIAL BRANCH NERVE BLOCK;  Surgeon: Bijan Hamilton MD;  Location: [address] [place] [address] FOR PAIN MANAGEMENT   • INJECTION, W/WO CONTRAST, DX/THERAPEUTIC SUBSTANCE, EPIDURAL/SUBARACHNOID; LUMBAR/SACRAL  7/17/2012    Procedure: VIANNEY FOLLOWING EVENTS  3/5/2013    Procedure: LUMBAR FACET INJECTION OR MEDIAL BRANCH NERVE BLOCK;  Surgeon: Lizz Hernandez MD;  Location: [facility] FOR PAIN MANAGEMENT   • PATIENT DOCUMENTED NOT TO HAVE EXPERIENCED ANY OF THE FOLLOWING EVENTS  7/29/2013    P UNLISTED  1999    Bone spurs removed (Rt shoulder)   • UPPER GI ENDOSCOPY PERFORMED  9/2/10= HH       Allergies:    Prochlorperazine            Comment:Other reaction(s):  Other (See Comments)             may worsen symptoms of parkinsonism  Allergy No complaints.   Sleeping well    Palliative Performance Scale:  50 %    Physical Examination:  General: Patient is alert and oriented, smiling more today  Respiratory: unlabored  Cardiac:  No edema  Abdomen: Soft, non tender   Musculoskeletal: Normal gait

## 2021-08-05 ENCOUNTER — TELEPHONE (OUTPATIENT)
Dept: INTERNAL MEDICINE CLINIC | Facility: CLINIC | Age: 81
End: 2021-08-05

## 2021-08-05 DIAGNOSIS — R03.0 ELEVATED BLOOD PRESSURE READING WITHOUT DIAGNOSIS OF HYPERTENSION: ICD-10-CM

## 2021-08-05 DIAGNOSIS — Z00.00 ROUTINE GENERAL MEDICAL EXAMINATION AT A HEALTH CARE FACILITY: Primary | ICD-10-CM

## 2021-08-05 DIAGNOSIS — E78.00 PURE HYPERCHOLESTEROLEMIA: ICD-10-CM

## 2021-08-05 RX ORDER — MIRTAZAPINE 7.5 MG/1
TABLET, FILM COATED ORAL
Qty: 30 TABLET | Refills: 0 | Status: CANCELLED | OUTPATIENT
Start: 2021-08-05

## 2021-08-05 NOTE — TELEPHONE ENCOUNTER
Future Appointments   Date Time Provider Leon Maria De Jesus   12/15/2021  1:20 PM Dick Scott MD [facility]       Orders to THE [facility]  aware must fast no call back required

## 2021-08-09 ENCOUNTER — LAB ENCOUNTER (OUTPATIENT)
Dept: LAB | Age: 81
End: 2021-08-09
Attending: INTERNAL MEDICINE
Payer: MEDICARE

## 2021-08-09 DIAGNOSIS — M81.0 AGE-RELATED OSTEOPOROSIS WITHOUT CURRENT PATHOLOGICAL FRACTURE: ICD-10-CM

## 2021-08-09 DIAGNOSIS — E78.00 PURE HYPERCHOLESTEROLEMIA: ICD-10-CM

## 2021-08-09 LAB
ALT SERPL-CCNC: 10 U/L
CALCIUM BLD-MCNC: 9.6 MG/DL (ref 8.5–10.1)
CHOLEST SMN-MCNC: 191 MG/DL (ref ?–200)
HDLC SERPL-MCNC: 88 MG/DL (ref 40–59)
LDLC SERPL CALC-MCNC: 89 MG/DL (ref ?–100)
NONHDLC SERPL-MCNC: 103 MG/DL (ref ?–130)
PATIENT FASTING Y/N/NP: YES
TRIGL SERPL-MCNC: 76 MG/DL (ref 30–149)
VIT D+METAB SERPL-MCNC: 47.4 NG/ML (ref 30–100)
VLDLC SERPL CALC-MCNC: 12 MG/DL (ref 0–30)

## 2021-08-09 PROCEDURE — 82310 ASSAY OF CALCIUM: CPT

## 2021-08-09 PROCEDURE — 84460 ALANINE AMINO (ALT) (SGPT): CPT

## 2021-08-09 PROCEDURE — 82306 VITAMIN D 25 HYDROXY: CPT

## 2021-08-09 PROCEDURE — 36415 COLL VENOUS BLD VENIPUNCTURE: CPT

## 2021-08-09 PROCEDURE — 80061 LIPID PANEL: CPT

## 2021-08-30 RX ORDER — MIRTAZAPINE 15 MG/1
TABLET, FILM COATED ORAL
Qty: 90 TABLET | Refills: 0 | Status: SHIPPED | OUTPATIENT
Start: 2021-08-30 | End: 2021-12-22

## 2021-09-01 ENCOUNTER — MED REC SCAN ONLY (OUTPATIENT)
Dept: INTERNAL MEDICINE CLINIC | Facility: CLINIC | Age: 81
End: 2021-09-01

## 2021-09-26 DIAGNOSIS — E78.00 PURE HYPERCHOLESTEROLEMIA: ICD-10-CM

## 2021-09-27 RX ORDER — SIMVASTATIN 20 MG
TABLET ORAL
Qty: 90 TABLET | Refills: 1 | Status: SHIPPED | OUTPATIENT
Start: 2021-09-27

## 2021-09-27 NOTE — TELEPHONE ENCOUNTER
Last VISIT - 6/14/21    Last CPE - 12/28/20    Last REFILL -     SIMVASTATIN 20 MG Oral Tab 90 tablet 1 2/2/2021     Last LABS - 8/9/21 LIPID 12/21/20 CBC    Future Appointments   Date Time Provider Leon Pretty   12/15/2021  1:20 PM Fauzia Dobbins,

## 2021-12-09 ENCOUNTER — LAB ENCOUNTER (OUTPATIENT)
Dept: LAB | Age: 81
End: 2021-12-09
Attending: INTERNAL MEDICINE
Payer: MEDICARE

## 2021-12-09 DIAGNOSIS — E78.00 PURE HYPERCHOLESTEROLEMIA: ICD-10-CM

## 2021-12-09 DIAGNOSIS — R03.0 ELEVATED BLOOD PRESSURE READING WITHOUT DIAGNOSIS OF HYPERTENSION: ICD-10-CM

## 2021-12-09 DIAGNOSIS — Z00.00 ROUTINE GENERAL MEDICAL EXAMINATION AT A HEALTH CARE FACILITY: ICD-10-CM

## 2021-12-09 PROCEDURE — 80061 LIPID PANEL: CPT

## 2021-12-09 PROCEDURE — 85025 COMPLETE CBC W/AUTO DIFF WBC: CPT

## 2021-12-09 PROCEDURE — 80053 COMPREHEN METABOLIC PANEL: CPT

## 2021-12-09 PROCEDURE — 36415 COLL VENOUS BLD VENIPUNCTURE: CPT

## 2021-12-15 ENCOUNTER — OFFICE VISIT (OUTPATIENT)
Dept: INTERNAL MEDICINE CLINIC | Facility: CLINIC | Age: 81
End: 2021-12-15
Payer: MEDICARE

## 2021-12-15 VITALS
RESPIRATION RATE: 16 BRPM | DIASTOLIC BLOOD PRESSURE: 52 MMHG | HEIGHT: 61 IN | WEIGHT: 96.63 LBS | HEART RATE: 76 BPM | SYSTOLIC BLOOD PRESSURE: 96 MMHG | BODY MASS INDEX: 18.24 KG/M2 | OXYGEN SATURATION: 97 % | TEMPERATURE: 98 F

## 2021-12-15 DIAGNOSIS — G25.81 RESTLESS LEGS SYNDROME (RLS): ICD-10-CM

## 2021-12-15 DIAGNOSIS — G20 PARKINSON DISEASE (HCC): ICD-10-CM

## 2021-12-15 DIAGNOSIS — Z00.00 ROUTINE GENERAL MEDICAL EXAMINATION AT A HEALTH CARE FACILITY: ICD-10-CM

## 2021-12-15 DIAGNOSIS — Z78.0 POST-MENOPAUSAL: ICD-10-CM

## 2021-12-15 DIAGNOSIS — Z51.5 PALLIATIVE CARE BY SPECIALIST: ICD-10-CM

## 2021-12-15 DIAGNOSIS — E78.00 PURE HYPERCHOLESTEROLEMIA: Primary | ICD-10-CM

## 2021-12-15 DIAGNOSIS — F02.80 DEMENTIA DUE TO PARKINSON'S DISEASE WITHOUT BEHAVIORAL DISTURBANCE (HCC): ICD-10-CM

## 2021-12-15 DIAGNOSIS — I73.00 RAYNAUD'S DISEASE WITHOUT GANGRENE: ICD-10-CM

## 2021-12-15 DIAGNOSIS — G20 DEMENTIA DUE TO PARKINSON'S DISEASE WITHOUT BEHAVIORAL DISTURBANCE (HCC): ICD-10-CM

## 2021-12-15 DIAGNOSIS — F32.2 CURRENT SEVERE EPISODE OF MAJOR DEPRESSIVE DISORDER WITHOUT PSYCHOTIC FEATURES WITHOUT PRIOR EPISODE (HCC): ICD-10-CM

## 2021-12-15 DIAGNOSIS — F41.9 ANXIETY: ICD-10-CM

## 2021-12-15 PROCEDURE — G0439 PPPS, SUBSEQ VISIT: HCPCS | Performed by: INTERNAL MEDICINE

## 2021-12-15 PROCEDURE — 99214 OFFICE O/P EST MOD 30 MIN: CPT | Performed by: INTERNAL MEDICINE

## 2021-12-15 RX ORDER — MEMANTINE HYDROCHLORIDE 10 MG/1
10 TABLET ORAL 2 TIMES DAILY
COMMUNITY

## 2021-12-20 NOTE — PROGRESS NOTES
Subjective:   Patient ID: Darrell Barrett is a 79year old female.     HPI  Here for awv, see list of multiple medical issues, doing well, no acute issues, dementia, has seen paliative, hyperchol, raynauds mild and controlled  BP 96/52 (BP Location: [facility] daily.     • Magnesium 100 MG Oral Tab Take by mouth. Not sure of dosage     • carbidopa-levodopa  MG Oral Tab Take 1.5 tablets by mouth 3 (three) times daily. 180 tablet 0     Allergies:  Prochlorperazine            Comment:Other reaction(s):  Othe ordered in this encounter       Imaging & Referrals:  XR DEXA BONE DENSITOMETRY (CPT=[id number])

## 2021-12-22 RX ORDER — MIRTAZAPINE 15 MG/1
TABLET, FILM COATED ORAL
Qty: 30 TABLET | Refills: 0 | Status: SHIPPED | OUTPATIENT
Start: 2021-12-22 | End: 2022-01-25

## 2022-01-01 ENCOUNTER — TELEPHONE (OUTPATIENT)
Dept: INTERNAL MEDICINE CLINIC | Facility: CLINIC | Age: 82
End: 2022-01-01

## 2022-01-01 RX ORDER — MIRTAZAPINE 15 MG/1
TABLET, FILM COATED ORAL
Qty: 30 TABLET | Refills: 1 | Status: CANCELLED | OUTPATIENT
Start: 2022-01-01

## 2022-01-25 RX ORDER — MIRTAZAPINE 15 MG/1
15 TABLET, FILM COATED ORAL NIGHTLY
Qty: 30 TABLET | Refills: 1 | Status: SHIPPED | OUTPATIENT
Start: 2022-01-25

## 2022-01-25 RX ORDER — MIRTAZAPINE 15 MG/1
TABLET, FILM COATED ORAL
Qty: 30 TABLET | Refills: 0 | Status: CANCELLED | OUTPATIENT
Start: 2022-01-25

## 2022-01-31 ENCOUNTER — OFFICE VISIT (OUTPATIENT)
Dept: HEMATOLOGY/ONCOLOGY | Facility: HOSPITAL | Age: 82
End: 2022-01-31
Attending: NURSE PRACTITIONER
Payer: MEDICARE

## 2022-01-31 VITALS
HEART RATE: 77 BPM | TEMPERATURE: 98 F | DIASTOLIC BLOOD PRESSURE: 76 MMHG | RESPIRATION RATE: 18 BRPM | OXYGEN SATURATION: 95 % | SYSTOLIC BLOOD PRESSURE: 120 MMHG

## 2022-01-31 DIAGNOSIS — R63.0 LACK OF APPETITE: Primary | ICD-10-CM

## 2022-01-31 DIAGNOSIS — G20 DEMENTIA DUE TO PARKINSON'S DISEASE WITHOUT BEHAVIORAL DISTURBANCE (HCC): ICD-10-CM

## 2022-01-31 DIAGNOSIS — F02.80 DEMENTIA DUE TO PARKINSON'S DISEASE WITHOUT BEHAVIORAL DISTURBANCE (HCC): ICD-10-CM

## 2022-01-31 DIAGNOSIS — Z71.89 GOALS OF CARE, COUNSELING/DISCUSSION: ICD-10-CM

## 2022-01-31 PROCEDURE — 99215 OFFICE O/P EST HI 40 MIN: CPT | Performed by: NURSE PRACTITIONER

## 2022-01-31 NOTE — PROGRESS NOTES
Palliative Care Follow Up Note     Patient Name: Pascual Nicholas   YOB: 1940   Medical Record Number: [id number]   CSN: [id number]   Date of visit: 1/31/2022       Chief Complaint/Reason for Visit:  Pain and appetite     History of Hammad hypertension     Allergic rhinitis, cause unspecified     Lumbago     Restless legs syndrome (RLS)     Nausea alone     Pure hypercholesterolemia     Raynaud's syndrome     Major depressive disorder     Anxiety     Lumbar degenerative disc disease     Bulg Parkinson's disease [facility])    • Personal history of antineoplastic chemotherapy 2004   • Problems with swallowing     sometimes   • Visual impairment     reading glasses     Surgical History:  Past Surgical History:   Procedure Laterality Date   • CHOLECYST Procedure: LUMBAR FACET INJECTION OR MEDIAL BRANCH NERVE BLOCK;  Surgeon: Octavio Bernardo MD;  Location: 1 [facility]  PAIN MANAGEMENT   • INJ PARAVERT F JNT L/S 2 LEV  3/5/2013    Procedure: LUMBAR FACET INJECTION OR MEDIAL BRANCH NERVE BLOCK;  Surgeon: for ovarian cancer   • OTHER SURGICAL HISTORY Bilateral     cataract surgery   • PATIENT DOCUMENTED NOT TO HAVE EXPERIENCED ANY OF THE FOLLOWING EVENTS  2/14/2013    Procedure: LUMBAR FACET INJECTION OR MEDIAL BRANCH NERVE BLOCK;  Surgeon: Clarisa Preston, EPIDURAL STEROID INJECTION;  Surgeon: Thaddeus Paniagua MD;  Location: [facility]   • PATIENT [place] PREOPERATIVE ORDER FOR IV ANTIBIOTIC SURGICAL SITE INFECTION PROPHYLAXIS. N/A 8/25/2015    Procedure: CAUDAL EPIDURAL STEROID INJECTION;  Surgeon:  Nahid B-12 250 MCG Oral Tab Take 250 mcg by mouth daily. • Magnesium 100 MG Oral Tab Take by mouth. Not sure of dosage     • carbidopa-levodopa  MG Oral Tab Take 1.5 tablets by mouth 3 (three) times daily.    180 tablet 0       Review of Systems:  Gener supplements  Small frequent meals  Mirtazapine 15mg Q HS    2. Depression/Anxiety  Mirtazapine 15mg Q HS  desvenlafaxine 100mg daily  Audio books  Stay active with friends and activity    3.  Goals  ongoing      Planned Follow up: Return in about 2 months (

## 2022-03-29 RX ORDER — SIMVASTATIN 20 MG
TABLET ORAL
Qty: 90 TABLET | Refills: 1 | Status: SHIPPED | OUTPATIENT
Start: 2022-03-29

## 2022-04-12 ENCOUNTER — MED REC SCAN ONLY (OUTPATIENT)
Dept: INTERNAL MEDICINE CLINIC | Facility: CLINIC | Age: 82
End: 2022-04-12

## 2022-04-27 ENCOUNTER — APPOINTMENT (OUTPATIENT)
Dept: HEMATOLOGY/ONCOLOGY | Facility: HOSPITAL | Age: 82
End: 2022-04-27
Attending: NURSE PRACTITIONER
Payer: MEDICARE

## 2022-05-02 ENCOUNTER — TELEPHONE (OUTPATIENT)
Dept: INTERNAL MEDICINE CLINIC | Facility: CLINIC | Age: 82
End: 2022-05-02

## 2022-05-02 RX ORDER — RIVASTIGMINE 13.3 MG/24H
PATCH, EXTENDED RELEASE TRANSDERMAL
COMMUNITY
Start: 2022-01-30

## 2022-05-02 NOTE — TELEPHONE ENCOUNTER
Patient Review of Clinical Information    Problems   The patient or proxy has not reviewed this information, and there are updates pending:   Requested Problem Additions Date Noted Reported By  Comments   Lymphocytic colitis  Caretha Logan    Small Intestine Bacterial Overgrowth  Caretha Logan    Parkinson's disease  Caretha Logan    Gastritis 2/28/2018 Caretha Logan    Esophagitis 2/28/2018 Caretha Logan      Medications   Currently there are no pending updates for this clinical area. Existing updates may have been reconciled. Allergies   The patient or proxy has not reviewed this information.

## 2022-05-09 ENCOUNTER — OFFICE VISIT (OUTPATIENT)
Dept: INTERNAL MEDICINE CLINIC | Facility: CLINIC | Age: 82
End: 2022-05-09
Payer: MEDICARE

## 2022-05-09 VITALS
RESPIRATION RATE: 18 BRPM | HEIGHT: 61 IN | SYSTOLIC BLOOD PRESSURE: 108 MMHG | WEIGHT: 100.19 LBS | DIASTOLIC BLOOD PRESSURE: 64 MMHG | BODY MASS INDEX: 18.92 KG/M2

## 2022-05-09 DIAGNOSIS — G25.81 RESTLESS LEGS SYNDROME (RLS): ICD-10-CM

## 2022-05-09 DIAGNOSIS — F32.2 CURRENT SEVERE EPISODE OF MAJOR DEPRESSIVE DISORDER WITHOUT PSYCHOTIC FEATURES WITHOUT PRIOR EPISODE (HCC): ICD-10-CM

## 2022-05-09 DIAGNOSIS — G20 PARKINSON DISEASE (HCC): ICD-10-CM

## 2022-05-09 DIAGNOSIS — Z11.59 ENCOUNTER FOR SCREENING FOR OTHER VIRAL DISEASES: Primary | ICD-10-CM

## 2022-05-09 PROCEDURE — 99214 OFFICE O/P EST MOD 30 MIN: CPT | Performed by: INTERNAL MEDICINE

## 2022-05-09 PROCEDURE — 86580 TB INTRADERMAL TEST: CPT | Performed by: INTERNAL MEDICINE

## 2022-05-09 NOTE — TELEPHONE ENCOUNTER
Future Appointments   Date Time Provider Leon Pretty   5/12/2022 10:00 AM [facility] NURSE [facility]   12/7/2022 11:20 AM Osorio Fregoso MD [facility]     Annual Physical   Future Appointments   Date Time Provider Leon Pretty   5/12/2022 10:00 AM [facility] NURSE [facility]   12/7/2022 11:20 AM Osorio Fregoso MD [facility]       Lab is Edward  Pt aware to fast and to complete labs no sooner than 2 weeks prior to physical   No call back required

## 2022-05-10 NOTE — TELEPHONE ENCOUNTER
Cbc, cmp, lipid completed 12/2021. Any further labs needed? Patient has lab appt for 5/2022, not sure if truly needs this labs appt vs before her physical in December. Patient physical scheduled 12/2022 .     Your Appointments    Thursday May 12, 2022 10:00 AM  Nurse Visit with SORAYA Thompson [address], [place] ([facility] [place]) [address] [facility] [address] [place] [address] [phone]      Wednesday December 07, 2022 11:20 AM  Medicare Annual Well [facility]  [facility] [place] [facility] ([facility]) [address] [address] [phone]

## 2022-05-12 ENCOUNTER — NURSE ONLY (OUTPATIENT)
Dept: INTERNAL MEDICINE CLINIC | Facility: CLINIC | Age: 82
End: 2022-05-12
Payer: MEDICARE

## 2022-05-12 ENCOUNTER — TELEPHONE (OUTPATIENT)
Dept: INTERNAL MEDICINE CLINIC | Facility: CLINIC | Age: 82
End: 2022-05-12

## 2022-05-12 DIAGNOSIS — Z91.81 AT RISK FOR FALLING: ICD-10-CM

## 2022-05-12 DIAGNOSIS — I73.00 RAYNAUD'S DISEASE WITHOUT GANGRENE: Primary | ICD-10-CM

## 2022-05-12 DIAGNOSIS — G89.29 CHRONIC BILATERAL LOW BACK PAIN WITHOUT SCIATICA: ICD-10-CM

## 2022-05-12 DIAGNOSIS — M54.50 CHRONIC BILATERAL LOW BACK PAIN WITHOUT SCIATICA: ICD-10-CM

## 2022-05-12 DIAGNOSIS — G20 PARKINSON DISEASE (HCC): ICD-10-CM

## 2022-05-12 DIAGNOSIS — C56.9 MALIGNANT NEOPLASM OF OVARY, UNSPECIFIED LATERALITY (HCC): ICD-10-CM

## 2022-05-12 LAB — INDURATION (): 0 MM (ref 0–11)

## 2022-05-12 NOTE — PROGRESS NOTES
Patient here for TB Read ; PPD read and documented. Copy of results provided to patient's spouse for Assisted Living location patient will be moving into.

## 2022-05-12 NOTE — TELEPHONE ENCOUNTER
[facility] paperwork received and placed in JL in basket for signature.

## 2022-05-16 NOTE — TELEPHONE ENCOUNTER
Sofía Ortiz from [facility], [facility]. and [address] #[phone] fax# [phone]  Calling to check on the status.

## 2022-05-18 NOTE — TELEPHONE ENCOUNTER
Called and spoke with  and with living facility. dme is not needed as patient will be bringing own walker and wheelchair to facility. PT and OT order are needed. JUDE signed forms. Faxed as requested and kris notified forms to be faxed today.

## 2022-12-02 PROBLEM — K29.70 GASTRITIS: Status: ACTIVE | Noted: 2018-02-28

## 2022-12-02 PROBLEM — K20.90 ESOPHAGITIS: Status: ACTIVE | Noted: 2018-02-28

## 2022-12-02 RX ORDER — CALCIUM ACETATE 667 MG/1
1334 CAPSULE ORAL
COMMUNITY

## 2022-12-02 RX ORDER — MAGNESIUM AMINO ACID CHELATE 100 MG
TABLET ORAL
COMMUNITY

## 2022-12-02 RX ORDER — OMEPRAZOLE 20 MG/1
20 CAPSULE, DELAYED RELEASE ORAL
COMMUNITY

## 2022-12-02 RX ORDER — POLYETHYLENE GLYCOL 3350 17 G/17G
17 POWDER, FOR SOLUTION ORAL DAILY
COMMUNITY

## 2022-12-02 RX ORDER — ACETAMINOPHEN 500 MG
TABLET ORAL
COMMUNITY

## 2022-12-02 RX ORDER — QUETIAPINE FUMARATE 25 MG/1
TABLET, FILM COATED ORAL
COMMUNITY
Start: 2022-08-15

## 2022-12-07 ENCOUNTER — OFFICE VISIT (OUTPATIENT)
Dept: INTERNAL MEDICINE CLINIC | Facility: CLINIC | Age: 82
End: 2022-12-07
Payer: MEDICARE

## 2022-12-07 ENCOUNTER — HOSPITAL ENCOUNTER (OUTPATIENT)
Dept: GENERAL RADIOLOGY | Age: 82
Discharge: HOME | End: 2022-12-07
Attending: INTERNAL MEDICINE
Payer: MEDICARE

## 2022-12-07 VITALS
RESPIRATION RATE: 12 BRPM | WEIGHT: 100 LBS | BODY MASS INDEX: 18.88 KG/M2 | OXYGEN SATURATION: 97 % | DIASTOLIC BLOOD PRESSURE: 66 MMHG | HEART RATE: 68 BPM | HEIGHT: 61 IN | SYSTOLIC BLOOD PRESSURE: 112 MMHG

## 2022-12-07 DIAGNOSIS — F02.80 DEMENTIA DUE TO PARKINSON'S DISEASE WITHOUT BEHAVIORAL DISTURBANCE (HCC): ICD-10-CM

## 2022-12-07 DIAGNOSIS — M25.552 LEFT HIP PAIN: ICD-10-CM

## 2022-12-07 DIAGNOSIS — Z00.00 ROUTINE GENERAL MEDICAL EXAMINATION AT A HEALTH CARE FACILITY: Primary | ICD-10-CM

## 2022-12-07 DIAGNOSIS — G20 PARKINSON DISEASE (HCC): ICD-10-CM

## 2022-12-07 DIAGNOSIS — G20 DEMENTIA DUE TO PARKINSON'S DISEASE WITHOUT BEHAVIORAL DISTURBANCE (HCC): ICD-10-CM

## 2022-12-07 DIAGNOSIS — F32.2 CURRENT SEVERE EPISODE OF MAJOR DEPRESSIVE DISORDER WITHOUT PSYCHOTIC FEATURES WITHOUT PRIOR EPISODE (HCC): ICD-10-CM

## 2022-12-07 DIAGNOSIS — R11.0 NAUSEA: ICD-10-CM

## 2022-12-07 DIAGNOSIS — Z78.0 POST-MENOPAUSAL: ICD-10-CM

## 2022-12-07 DIAGNOSIS — I73.00 RAYNAUD'S DISEASE WITHOUT GANGRENE: ICD-10-CM

## 2022-12-07 DIAGNOSIS — E78.00 PURE HYPERCHOLESTEROLEMIA: ICD-10-CM

## 2022-12-07 DIAGNOSIS — E46 PROTEIN-CALORIE MALNUTRITION, UNSPECIFIED SEVERITY (HCC): ICD-10-CM

## 2022-12-07 PROCEDURE — 73502 X-RAY EXAM HIP UNI 2-3 VIEWS: CPT | Performed by: INTERNAL MEDICINE

## 2022-12-07 RX ORDER — ONDANSETRON 4 MG/1
4 TABLET, FILM COATED ORAL EVERY 12 HOURS PRN
Qty: 30 TABLET | Refills: 1 | Status: SHIPPED | OUTPATIENT
Start: 2022-12-07

## 2022-12-07 RX ORDER — AZITHROMYCIN 250 MG/1
TABLET, FILM COATED ORAL
Qty: 6 TABLET | Refills: 0 | Status: SHIPPED | OUTPATIENT
Start: 2022-12-07 | End: 2022-12-12

## 2023-02-27 ENCOUNTER — PATIENT MESSAGE (OUTPATIENT)
Dept: INTERNAL MEDICINE CLINIC | Facility: CLINIC | Age: 83
End: 2023-02-27

## 2023-02-28 NOTE — TELEPHONE ENCOUNTER
From: Krishna Celis  To: David Kelley MD  Sent: 2/27/2023 2:49 PM CST  Subject: Rc Valladares  This message is to let know that Brielle Lilly passed away last Thursday February 23rd. She had emergency surgery for an intestinal blockage. It fixed the problem but apparently her systems were unable to recover and she left us about 14 hours later. Thank you for being her primary care doctor for these past many (30?) years.   Abida Pritchard

## 2023-03-02 NOTE — TELEPHONE ENCOUNTER
Marked patient as . Problem: PAIN - ADULT  Goal: Verbalizes/displays adequate comfort level or baseline comfort level  Description: Interventions:  - Encourage patient to monitor pain and request assistance  - Assess pain using appropriate pain scale  - Administer analgesics based on type and severity of pain and evaluate response  - Implement non-pharmacological measures as appropriate and evaluate response  - Consider cultural and social influences on pain and pain management  - Notify physician/advanced practitioner if interventions unsuccessful or patient reports new pain  Outcome: Progressing     Problem: INFECTION - ADULT  Goal: Absence or prevention of progression during hospitalization  Description: INTERVENTIONS:  - Assess and monitor for signs and symptoms of infection  - Monitor lab/diagnostic results  - Monitor all insertion sites, i e  indwelling lines, tubes, and drains  - Monitor endotracheal if appropriate and nasal secretions for changes in amount and color  - [place] appropriate cooling/warming therapies per order  - Administer medications as ordered  - Instruct and encourage patient and family to use good hand hygiene technique  - Identify and instruct in appropriate isolation precautions for identified infection/condition  Outcome: Progressing     Problem: SAFETY ADULT  Goal: Patient will remain free of falls  Description: INTERVENTIONS:  - Assess patient frequently for physical needs  -  Identify cognitive and physical deficits and behaviors that affect risk of falls    -  [place] fall precautions as indicated by assessment   - Educate patient/family on patient safety including physical limitations  - Instruct patient to call for assistance with activity based on assessment  - Modify environment to reduce risk of injury  - Consider OT/PT consult to assist with strengthening/mobility  Outcome: Progressing  Goal: Maintain or return to baseline ADL function  Description: INTERVENTIONS:  -  Assess patient's ability to carry out ADLs; assess patient's baseline for ADL function and identify physical deficits which impact ability to perform ADLs (bathing, care of mouth/teeth, toileting, grooming, dressing, etc )  - Assess/evaluate cause of self-care deficits   - Assess range of motion  - Assess patient's mobility; develop plan if impaired  - Assess patient's need for assistive devices and provide as appropriate  - Encourage maximum independence but intervene and supervise when necessary  - Involve family in performance of ADLs  - Assess for home care needs following discharge   - Consider OT consult to assist with ADL evaluation and planning for discharge  - Provide patient education as appropriate  Outcome: Progressing  Goal: Maintain or return mobility status to optimal level  Description: INTERVENTIONS:  - Assess patient's baseline mobility status (ambulation, transfers, stairs, etc )    - Identify cognitive and physical deficits and behaviors that affect mobility  - Identify mobility aids required to assist with transfers and/or ambulation (gait belt, sit-to-stand, lift, walker, cane, etc )  - [place] fall precautions as indicated by assessment  - Record patient progress and toleration of activity level on Mobility SBAR; progress patient to next Phase/Stage  - Instruct patient to call for assistance with activity based on assessment  - Consider rehabilitation consult to assist with strengthening/weightbearing, etc   Outcome: Progressing     Problem: DISCHARGE PLANNING  Goal: Discharge to home or other facility with appropriate resources  Description: INTERVENTIONS:  - Identify barriers to discharge w/patient and caregiver  - Arrange for needed discharge resources and transportation as appropriate  - Identify discharge learning needs (meds, wound care, etc )  - Arrange for interpretive services to assist at discharge as needed  - Refer to Case Management Department for coordinating discharge planning if the patient needs post-hospital services based on physician/advanced practitioner order or complex needs related to functional status, cognitive ability, or social support system  Outcome: Progressing     Problem: Knowledge Deficit  Goal: Patient/family/caregiver demonstrates understanding of disease process, treatment plan, medications, and discharge instructions  Description: Complete learning assessment and assess knowledge base  Interventions:  - Provide teaching at level of understanding  - Provide teaching via preferred learning methods  Outcome: Progressing     Problem: GASTROINTESTINAL - ADULT  Goal: Minimal or absence of nausea and/or vomiting  Description: INTERVENTIONS:  - Administer IV fluids if ordered to ensure adequate hydration  - Maintain NPO status until nausea and vomiting are resolved  - Nasogastric tube if ordered  - Administer ordered antiemetic medications as needed  - Provide nonpharmacologic comfort measures as appropriate  - Advance diet as tolerated, if ordered  - Consider nutrition services referral to assist patient with adequate nutrition and appropriate food choices  Outcome: Progressing     Problem: METABOLIC, FLUID AND ELECTROLYTES - ADULT  Goal: Electrolytes maintained within normal limits  Description: INTERVENTIONS:  - Monitor labs and assess patient for signs and symptoms of electrolyte imbalances  - Administer electrolyte replacement as ordered  - Monitor response to electrolyte replacements, including repeat lab results as appropriate  - Instruct patient on fluid and nutrition as appropriate  Outcome: Progressing     Problem: Potential for Falls  Goal: Patient will remain free of falls  Description: INTERVENTIONS:  - Assess patient frequently for physical needs  -  Identify cognitive and physical deficits and behaviors that affect risk of falls    -  [place] fall precautions as indicated by assessment   - Educate patient/family on patient safety including physical limitations  - Instruct patient to call for assistance with activity based on assessment  - Modify environment to reduce risk of injury  - Consider OT/PT consult to assist with strengthening/mobility  Outcome: Progressing

## (undated) DIAGNOSIS — E78.00 PURE HYPERCHOLESTEROLEMIA: ICD-10-CM

## (undated) DEVICE — 3M™ RED DOT™ MONITORING ELECTRODE WITH FOAM TAPE AND STICKY GEL, 50/BAG, 20/CASE, 72/PLT 2570: Brand: RED DOT™

## (undated) DEVICE — FORCEP BIOPSY RJ4 LG CAP W/ND

## (undated) DEVICE — Device: Brand: DEFENDO AIR/WATER/SUCTION AND BIOPSY VALVE

## (undated) DEVICE — FILTERLINE NASAL ADULT O2/CO2

## (undated) DEVICE — ENDOSCOPY PACK UPPER: Brand: MEDLINE INDUSTRIES, INC.

## (undated) DEVICE — 1200CC GUARDIAN II: Brand: GUARDIAN

## (undated) DEVICE — CATH BALLOON CRE 18-20MM 5838

## (undated) DEVICE — SYRINGE/GUAGE ASSEMBLY

## (undated) NOTE — LETTER
3/5/2018          [address] [place]    Dear Kristin Corrales,       Here are the biopsy/pathology findings from your recent EGD (Upper  Endoscopy) :     gastritis - an inflammation of the lining of the stomach.       Follow

## (undated) NOTE — Clinical Note
Came in for 6 mos f/u from [facility] [place]. Stable, same chronic issues (see note). I did not know where to tell them to go next. They plan to return to [facility] for further direction.   I told them I would communicate with you to see if you had any other idea

## (undated) NOTE — LETTER
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     This is very rare

## (undated) NOTE — MR AVS SNAPSHOT
[facility] [address] [phone]               Thank you for choosing us for your health care visit with Ruddy Cardenas PA-C.   We are glad to serve you and happy to provide you with this garcia Imaging:  MRI ABDOMEN (W+WO) (HWV=[id number])    Instructions:  IMPORTANT    Your physician has ordered a radiology test that may require authorization from your insurance company.   Your physician or the clinic staff will work with your insurance company to o simvastatin 20 MG Tabs   TAKE 1 TABLET EVERY NIGHT   Commonly known as:  ZOCOR           SINEMET  MG Tabs   Generic drug:  carbidopa-levodopa   Take 2 tablets by mouth 3 (three) times daily.            Vitamin B-12 250 MCG Tabs   Take 250 mcg by mout

## (undated) NOTE — LETTER
[facility]  Hallie Avila 60 [address] [facility], [address] [place] [address]    Consent for Operation    Date: __________________    Time: _______________    1.  I authorize the performance upon Melissa Hernandez the following operation:    Procedure(s):  ESOPHAGOGASTRODUO procedure has been videotaped, the surgeon will obtain the original videotape. The hospital will not be responsible for storage or maintenance of this tape.     6. For the purpose of advancing medical education, I consent to the admittance of observers to t STATEMENTS REQUIRING INSERTION OR COMPLETION WERE FILLED IN.     Signature of Patient:   ___________________________    When the patient is a minor or mentally incompetent to give consent:  Signature of person authorized to consent for patient: ____________ supplements, and pills I can buy without a prescription (including street drugs/illegal medications). Failure to inform my anesthesiologist about these medicines may increase my risk of anesthetic complications.   · If I am allergic to anything or have had Anesthesiologist Signature     Date   Time  I have discussed the procedure and information above with the patient (or patient’s representative) and answered their questions. The patient or their representative has agreed to have anesthesia services.     ___

## (undated) NOTE — LETTER
08/03/21        [address]      Dear Kaden Schwartz records indicate that you have outstanding lab work and or testing that was ordered for you and has not yet been completed:  Orders Placed This Encount

## (undated) NOTE — LETTER
11/28/17        [address] [place] [address] [place]      Dear Maurisio Lacy records indicate that you have outstanding lab work and or testing that was ordered for you and has not yet been completed:    Comp Metabolic Panel (14) [E]